# Patient Record
Sex: FEMALE | Race: WHITE | NOT HISPANIC OR LATINO | Employment: STUDENT | URBAN - METROPOLITAN AREA
[De-identification: names, ages, dates, MRNs, and addresses within clinical notes are randomized per-mention and may not be internally consistent; named-entity substitution may affect disease eponyms.]

---

## 2019-01-16 ENCOUNTER — HOSPITAL ENCOUNTER (OUTPATIENT)
Dept: FAMILY MEDICINE CLINIC | Facility: CLINIC | Age: 24
Discharge: HOME | End: 2019-01-16
Attending: NURSE PRACTITIONER

## 2019-01-16 ENCOUNTER — OFFICE VISIT CONVERTED (OUTPATIENT)
Dept: FAMILY MEDICINE CLINIC | Facility: CLINIC | Age: 24
End: 2019-01-16
Attending: NURSE PRACTITIONER

## 2019-01-16 ENCOUNTER — CONVERSION ENCOUNTER (OUTPATIENT)
Dept: FAMILY MEDICINE CLINIC | Facility: CLINIC | Age: 24
End: 2019-01-16

## 2019-01-16 LAB
25(OH)D3 SERPL-MCNC: 18.3 NG/ML (ref 30–100)
ALBUMIN SERPL-MCNC: 4.5 G/DL (ref 3.5–5)
ALBUMIN/GLOB SERPL: 1.4 {RATIO} (ref 1.4–2.6)
ALP SERPL-CCNC: 87 U/L (ref 42–98)
ALT SERPL-CCNC: 35 U/L (ref 10–40)
AMYLASE SERPL-CCNC: 59 U/L (ref 30–110)
ANION GAP SERPL CALC-SCNC: 18 MMOL/L (ref 8–19)
AST SERPL-CCNC: 25 U/L (ref 15–50)
BASOPHILS # BLD AUTO: 0.06 10*3/UL (ref 0–0.2)
BASOPHILS NFR BLD AUTO: 0.77 % (ref 0–3)
BILIRUB SERPL-MCNC: 0.56 MG/DL (ref 0.2–1.3)
BUN SERPL-MCNC: 12 MG/DL (ref 5–25)
BUN/CREAT SERPL: 15 {RATIO} (ref 6–20)
CALCIUM SERPL-MCNC: 9.4 MG/DL (ref 8.7–10.4)
CHLORIDE SERPL-SCNC: 101 MMOL/L (ref 99–111)
CHOLEST SERPL-MCNC: 192 MG/DL (ref 107–200)
CHOLEST/HDLC SERPL: 3.7 {RATIO} (ref 3–6)
CONV CO2: 24 MMOL/L (ref 22–32)
CONV TOTAL PROTEIN: 7.7 G/DL (ref 6.3–8.2)
CREAT UR-MCNC: 0.82 MG/DL (ref 0.5–0.9)
EOSINOPHIL # BLD AUTO: 0.13 10*3/UL (ref 0–0.7)
EOSINOPHIL # BLD AUTO: 1.82 % (ref 0–7)
ERYTHROCYTE [DISTWIDTH] IN BLOOD BY AUTOMATED COUNT: 11.6 % (ref 11.5–14.5)
EST. AVERAGE GLUCOSE BLD GHB EST-MCNC: 100 MG/DL
GFR SERPLBLD BASED ON 1.73 SQ M-ARVRAT: >60 ML/MIN/{1.73_M2}
GLOBULIN UR ELPH-MCNC: 3.2 G/DL (ref 2–3.5)
GLUCOSE SERPL-MCNC: 85 MG/DL (ref 65–99)
HBA1C MFR BLD: 14.8 G/DL (ref 12–16)
HBA1C MFR BLD: 5.1 % (ref 3.5–5.7)
HCT VFR BLD AUTO: 45 % (ref 37–47)
HDLC SERPL-MCNC: 52 MG/DL (ref 40–60)
LDLC SERPL CALC-MCNC: 114 MG/DL (ref 70–100)
LIPASE SERPL-CCNC: 23 U/L (ref 5–51)
LYMPHOCYTES # BLD AUTO: 2.13 10*3/UL (ref 1–5)
MCH RBC QN AUTO: 29.4 PG (ref 27–31)
MCHC RBC AUTO-ENTMCNC: 32.9 G/DL (ref 33–37)
MCV RBC AUTO: 89.6 FL (ref 81–99)
MONOCYTES # BLD AUTO: 0.41 10*3/UL (ref 0.2–1.2)
MONOCYTES NFR BLD AUTO: 5.71 % (ref 3–10)
NEUTROPHILS # BLD AUTO: 4.39 10*3/UL (ref 2–8)
NEUTROPHILS NFR BLD AUTO: 61.8 % (ref 30–85)
NRBC BLD AUTO-RTO: 0 % (ref 0–0.01)
OSMOLALITY SERPL CALC.SUM OF ELEC: 287 MOSM/KG (ref 273–304)
PLATELET # BLD AUTO: 330 10*3/UL (ref 130–400)
PMV BLD AUTO: 6.6 FL (ref 7.4–10.4)
POTASSIUM SERPL-SCNC: 4.2 MMOL/L (ref 3.5–5.3)
RBC # BLD AUTO: 5.02 10*6/UL (ref 4.2–5.4)
SODIUM SERPL-SCNC: 139 MMOL/L (ref 135–147)
T4 FREE SERPL-MCNC: 1.3 NG/DL (ref 0.9–1.8)
TRIGL SERPL-MCNC: 130 MG/DL (ref 40–150)
TSH SERPL-ACNC: 4.84 M[IU]/L (ref 0.27–4.2)
VARIANT LYMPHS NFR BLD MANUAL: 29.9 % (ref 20–45)
VLDLC SERPL-MCNC: 26 MG/DL (ref 5–37)
WBC # BLD AUTO: 7.11 10*3/UL (ref 4.8–10.8)

## 2019-01-17 LAB
A FUMIGATUS AB SER QL ID: <0.1 K[IU]/ML
AMER SYCAMORE IGE QN: <0.1 K[IU]/ML
BERMUDA GRASS IGE QN: <0.1 K[IU]/ML (ref 0–0.35)
BOXELDER IGE QN: <0.1 K[IU]/ML
CALIF WALNUT POLN IGE QN: <0.1 K[IU]/ML (ref 0–0.35)
CAT DANDER IGG QN: <0.1 K[IU]/ML (ref 0–0.35)
CLADOSPORIUM IGE: <0.1 K[IU]/ML
CMN PIGWEED IGE QN: <0.1 K[IU]/ML
COCOA IGE QN: <0.1 K[IU]/ML (ref 0–0.35)
COMMON RAGWEED IGE QN: <0.1 K[IU]/ML (ref 0–0.35)
CORN IGE QN: <0.1 K[IU]/ML (ref 0–0.35)
COTTONWOOD IGE QN: <0.1 K[IU]/ML
COW MILK IGE QN: <0.1 K[IU]/ML (ref 0–0.35)
D FARINAE IGE QN: <0.1 K[IU]/ML (ref 0–0.35)
D PTERONYSS IGE QN: <0.1 K[IU]/ML (ref 0–0.35)
DOG DANDER IGE QN: <0.1 K[IU]/ML (ref 0–0.35)
EGG WHITE IGE QN: <0.1 K[IU]/ML (ref 0–0.35)
GOOSEFOOT IGE QN: <0.1 K[IU]/ML (ref 0–0.35)
IGE SERPL-ACNC: 17 K[IU]/ML (ref 0–24)
IMMUNOCAP RESULT: NORMAL (ref 0–0)
JOHNSON GRASS IGE QN: <0.1 K[IU]/ML (ref 0–0.35)
MEADOW FESCUE IGE QN: <0.1 K[IU]/ML (ref 0–0.35)
MOLD IGE: <0.1 K[IU]/ML (ref 0–0.35)
MOUSE URINE PROT IGE QN: <0.1 K[IU]/ML
MT JUNIPER IGE QN: <0.1 K[IU]/ML
OAK DUST IGE QN: <0.1 K[IU]/ML (ref 0–0.35)
OAT IGE QN: <0.1 K[IU]/ML (ref 0–0.35)
P NOTATUM IGE QN: <0.1 K[IU]/ML
PEANUT IGE QN: <0.1 K[IU]/ML (ref 0–0.35)
PECAN/HICK TREE IGE QN: <0.1 K[IU]/ML (ref 0–0.35)
RICE IGE QN: <0.1 K[IU]/ML (ref 0–0.35)
ROACH IGE QN: <0.1 K[IU]/ML (ref 0–0.35)
SOYBEAN IGE QN: <0.1 K[IU]/ML (ref 0–0.35)
TIMOTHY IGE QN: <0.1 K[IU]/ML
WHEAT IGE QN: <0.1 K[IU]/ML (ref 0–0.35)
WHITE ASH IGE QN: <0.1 K[IU]/ML
WHITE BIRCH IGE QN: <0.1 K[IU]/ML (ref 0–0.35)
WHITE ELM IGE QN: <0.1 K[IU]/ML (ref 0–0.35)
WHITE MULBERRY IGE QN: <0.1 K[IU]/ML

## 2019-01-18 ENCOUNTER — HOSPITAL ENCOUNTER (OUTPATIENT)
Dept: ULTRASOUND IMAGING | Facility: HOSPITAL | Age: 24
Discharge: HOME | End: 2019-01-18
Attending: NURSE PRACTITIONER

## 2019-01-19 LAB — CONV CELIAC DISEASE AB-IGA: 200 MG/DL (ref 68–408)

## 2019-01-20 LAB — TTG IGA SER-ACNC: 0 U/ML (ref 0–3)

## 2019-02-06 ENCOUNTER — OFFICE VISIT CONVERTED (OUTPATIENT)
Dept: FAMILY MEDICINE CLINIC | Facility: CLINIC | Age: 24
End: 2019-02-06
Attending: NURSE PRACTITIONER

## 2019-02-06 ENCOUNTER — HOSPITAL ENCOUNTER (OUTPATIENT)
Dept: FAMILY MEDICINE CLINIC | Facility: CLINIC | Age: 24
Discharge: HOME | End: 2019-02-06
Attending: NURSE PRACTITIONER

## 2019-02-06 LAB
T4 FREE SERPL-MCNC: 1.2 NG/DL (ref 0.9–1.8)
TSH SERPL-ACNC: 3.79 M[IU]/L (ref 0.27–4.2)

## 2019-02-08 ENCOUNTER — HOSPITAL ENCOUNTER (OUTPATIENT)
Dept: GENERAL RADIOLOGY | Facility: HOSPITAL | Age: 24
Discharge: HOME | End: 2019-02-08
Attending: NURSE PRACTITIONER

## 2019-02-08 LAB
BACTERIA SPEC AEROBE CULT: NORMAL
CONV LAST MENSTURAL PERIOD: NORMAL
SPECIMEN SOURCE: NORMAL
SPECIMEN SOURCE: NORMAL
THIN PREP CVX: NORMAL

## 2019-09-18 ENCOUNTER — OFFICE VISIT CONVERTED (OUTPATIENT)
Dept: FAMILY MEDICINE CLINIC | Facility: CLINIC | Age: 24
End: 2019-09-18
Attending: NURSE PRACTITIONER

## 2020-06-09 ENCOUNTER — HOSPITAL ENCOUNTER (OUTPATIENT)
Dept: FAMILY MEDICINE CLINIC | Facility: CLINIC | Age: 25
Discharge: HOME | End: 2020-06-09
Attending: NURSE PRACTITIONER

## 2020-06-09 ENCOUNTER — OFFICE VISIT CONVERTED (OUTPATIENT)
Dept: FAMILY MEDICINE CLINIC | Facility: CLINIC | Age: 25
End: 2020-06-09
Attending: NURSE PRACTITIONER

## 2020-06-11 LAB
BACTERIA SPEC AEROBE CULT: ABNORMAL
BACTERIA SPEC AEROBE CULT: ABNORMAL

## 2020-06-15 LAB
CONV LAST MENSTURAL PERIOD: NORMAL
SPECIMEN SOURCE: NORMAL
SPECIMEN SOURCE: NORMAL
THIN PREP CVX: NORMAL

## 2020-06-18 ENCOUNTER — HOSPITAL ENCOUNTER (OUTPATIENT)
Dept: ULTRASOUND IMAGING | Facility: HOSPITAL | Age: 25
Discharge: HOME | End: 2020-06-18
Attending: NURSE PRACTITIONER

## 2020-07-23 ENCOUNTER — HOSPITAL ENCOUNTER (OUTPATIENT)
Dept: FAMILY MEDICINE CLINIC | Facility: CLINIC | Age: 25
Discharge: HOME | End: 2020-07-23
Attending: NURSE PRACTITIONER

## 2020-07-23 ENCOUNTER — OFFICE VISIT CONVERTED (OUTPATIENT)
Dept: FAMILY MEDICINE CLINIC | Facility: CLINIC | Age: 25
End: 2020-07-23
Attending: NURSE PRACTITIONER

## 2020-07-26 LAB — BACTERIA SPEC AEROBE CULT: NORMAL

## 2020-10-27 ENCOUNTER — CONVERSION ENCOUNTER (OUTPATIENT)
Dept: FAMILY MEDICINE CLINIC | Facility: CLINIC | Age: 25
End: 2020-10-27

## 2020-10-27 ENCOUNTER — OFFICE VISIT CONVERTED (OUTPATIENT)
Dept: FAMILY MEDICINE CLINIC | Facility: CLINIC | Age: 25
End: 2020-10-27
Attending: NURSE PRACTITIONER

## 2020-12-01 ENCOUNTER — OFFICE VISIT CONVERTED (OUTPATIENT)
Dept: FAMILY MEDICINE CLINIC | Facility: CLINIC | Age: 25
End: 2020-12-01
Attending: NURSE PRACTITIONER

## 2020-12-01 ENCOUNTER — HOSPITAL ENCOUNTER (OUTPATIENT)
Dept: FAMILY MEDICINE CLINIC | Facility: CLINIC | Age: 25
Discharge: HOME | End: 2020-12-01
Attending: NURSE PRACTITIONER

## 2020-12-01 LAB
ALBUMIN SERPL-MCNC: 4.2 G/DL (ref 3.5–5)
ALBUMIN/GLOB SERPL: 1.6 {RATIO} (ref 1.4–2.6)
ALP SERPL-CCNC: 84 U/L (ref 42–98)
ALT SERPL-CCNC: 33 U/L (ref 10–40)
ANION GAP SERPL CALC-SCNC: 16 MMOL/L (ref 8–19)
AST SERPL-CCNC: 23 U/L (ref 15–50)
BASOPHILS # BLD AUTO: 0.06 10*3/UL (ref 0–0.2)
BASOPHILS NFR BLD AUTO: 0.9 % (ref 0–3)
BILIRUB SERPL-MCNC: 0.46 MG/DL (ref 0.2–1.3)
BUN SERPL-MCNC: 11 MG/DL (ref 5–25)
BUN/CREAT SERPL: 14 {RATIO} (ref 6–20)
CALCIUM SERPL-MCNC: 9.5 MG/DL (ref 8.7–10.4)
CHLORIDE SERPL-SCNC: 102 MMOL/L (ref 99–111)
CHOLEST SERPL-MCNC: 217 MG/DL (ref 107–200)
CHOLEST/HDLC SERPL: 3.7 {RATIO} (ref 3–6)
CONV ABS IMM GRAN: 0.03 10*3/UL (ref 0–0.2)
CONV CO2: 23 MMOL/L (ref 22–32)
CONV IMMATURE GRAN: 0.4 % (ref 0–1.8)
CONV TOTAL PROTEIN: 6.8 G/DL (ref 6.3–8.2)
CREAT UR-MCNC: 0.81 MG/DL (ref 0.5–0.9)
DEPRECATED RDW RBC AUTO: 40.5 FL (ref 36.4–46.3)
EOSINOPHIL # BLD AUTO: 0.2 10*3/UL (ref 0–0.7)
EOSINOPHIL # BLD AUTO: 2.9 % (ref 0–7)
ERYTHROCYTE [DISTWIDTH] IN BLOOD BY AUTOMATED COUNT: 12.4 % (ref 11.7–14.4)
GFR SERPLBLD BASED ON 1.73 SQ M-ARVRAT: >60 ML/MIN/{1.73_M2}
GLOBULIN UR ELPH-MCNC: 2.6 G/DL (ref 2–3.5)
GLUCOSE SERPL-MCNC: 96 MG/DL (ref 65–99)
HCT VFR BLD AUTO: 42.2 % (ref 37–47)
HDLC SERPL-MCNC: 59 MG/DL (ref 40–60)
HGB BLD-MCNC: 13.6 G/DL (ref 12–16)
LDLC SERPL CALC-MCNC: 126 MG/DL (ref 70–100)
LYMPHOCYTES # BLD AUTO: 2.08 10*3/UL (ref 1–5)
LYMPHOCYTES NFR BLD AUTO: 30.3 % (ref 20–45)
MCH RBC QN AUTO: 28.8 PG (ref 27–31)
MCHC RBC AUTO-ENTMCNC: 32.2 G/DL (ref 33–37)
MCV RBC AUTO: 89.4 FL (ref 81–99)
MONOCYTES # BLD AUTO: 0.47 10*3/UL (ref 0.2–1.2)
MONOCYTES NFR BLD AUTO: 6.8 % (ref 3–10)
NEUTROPHILS # BLD AUTO: 4.03 10*3/UL (ref 2–8)
NEUTROPHILS NFR BLD AUTO: 58.7 % (ref 30–85)
NRBC CBCN: 0 % (ref 0–0.7)
OSMOLALITY SERPL CALC.SUM OF ELEC: 283 MOSM/KG (ref 273–304)
PLATELET # BLD AUTO: 319 10*3/UL (ref 130–400)
PMV BLD AUTO: 9.1 FL (ref 9.4–12.3)
POTASSIUM SERPL-SCNC: 4.4 MMOL/L (ref 3.5–5.3)
RBC # BLD AUTO: 4.72 10*6/UL (ref 4.2–5.4)
SODIUM SERPL-SCNC: 137 MMOL/L (ref 135–147)
TRIGL SERPL-MCNC: 159 MG/DL (ref 40–150)
TSH SERPL-ACNC: 4.95 M[IU]/L (ref 0.27–4.2)
VLDLC SERPL-MCNC: 32 MG/DL (ref 5–37)
WBC # BLD AUTO: 6.87 10*3/UL (ref 4.8–10.8)

## 2021-02-02 ENCOUNTER — OFFICE VISIT CONVERTED (OUTPATIENT)
Dept: FAMILY MEDICINE CLINIC | Facility: CLINIC | Age: 26
End: 2021-02-02
Attending: NURSE PRACTITIONER

## 2021-04-01 ENCOUNTER — OFFICE VISIT CONVERTED (OUTPATIENT)
Dept: FAMILY MEDICINE CLINIC | Facility: CLINIC | Age: 26
End: 2021-04-01
Attending: NURSE PRACTITIONER

## 2021-04-01 ENCOUNTER — HOSPITAL ENCOUNTER (OUTPATIENT)
Dept: LAB | Facility: HOSPITAL | Age: 26
Discharge: HOME | End: 2021-04-01
Attending: NURSE PRACTITIONER

## 2021-04-01 LAB
T4 FREE SERPL-MCNC: 1.3 NG/DL (ref 0.9–1.8)
TSH SERPL-ACNC: 1.31 M[IU]/L (ref 0.27–4.2)

## 2021-05-13 NOTE — PROGRESS NOTES
"   Progress Note      Patient Name: Stephania Galvan   Patient ID: [id number]   Sex: Female   YOB: 1995    Primary Care Provider: Sai ZHANG   Referring Provider: Sai ZHANG    Visit Date: October 27, 2020    Provider: LEATHA Caraballo   Location: [facility]   Location Address: [address] [place] [address]  [id number]   Location Phone: [phone]          Chief Complaint  · acute visit for worsening menstrual cramps       History Of Present Illness  Stephania Galvan is a 25 year old /White female who presents for evaluation and treatment of:      acute visit for worsening menstrual cramps     pt states that the pain has been going on since the beginning of the month. Pt states that she has these cramps even when she is not on period.     pt states that she usually has bad cramps but they are worse now.  no current birth control  would like to try bcp and refer to GYN to discuss IUD    Still has migraines almost every day since august when she started college    pt reports same presentation   occipital area  +photophobia  +phonophobia  migraines since adolescents     pt states that she uses Menstrual cups and states that the smell is a strong \"blanche\" odor         WIF-09-38-20   last pap-06-       Past Medical History  Disease Name Date Onset Notes   Migraines --  --          Medication List  Name Date Started Instructions   Imitrex 100 mg oral tablet 06/09/2020 take 1 tablet (100 mg) by oral route after onset of migraine; may repeat after 2 hours if headache returns   Singulair 10 mg Oral tablet 06/09/2020 take 1 tablet (10 mg) by oral route once daily in the evening for 90 days   Zyrtec 10 mg oral tablet 06/09/2020 take 1 tablet by oral route once a day (at bedtime) for 90 days         Allergy List  Allergen Name Date Reaction Notes   NO KNOWN DRUG ALLERGIES --  --  --          Family Medical " History  Disease Name Relative/Age Notes   Stroke  --    Heart Disease  --    Diabetes Mellitus, Type II  --    Family history of breast cancer  --          Social History  Finding Status Start/Stop Quantity Notes   Alcohol Current some day --/-- --  --    Tobacco Never --/-- --  --          Immunizations  NameDate Admin Mfg Trade Name Lot Number Route Inj VIS Given VIS Publication   Shgguuprq24/15/2019 [facility] Fluzone Quadrivalent [id number] IM RD 10/15/2019    Comments: ndc [id number]         Review of Systems  · Constitutional  o Denies  o : fever, chills  · Eyes  o Denies  o : changes in vision  · HENT  o Admits  o : headaches  o Denies  o : ear pain, sore throat  · Cardiovascular  o Denies  o : chest Pain  · Respiratory  o Denies  o : frequent cough, shortness of breath  · Gastrointestinal  o Denies  o : nausea, vomiting  · Genitourinary  o Admits  o : dysmenorrhea, menorrhagia  o Denies  o : dysuria  · Neurologic  o Admits  o : headache  o Denies  o : dizziness  · Allergic-Immunologic  o Admits  o : seasonal allergies  o Denies  o : eczema, urticaria      Vitals  Date Time BP Position Site L\R Cuff Size HR RR TEMP (F) WT  HT  BMI kg/m2 BSA m2 O2 Sat FR L/min FiO2 HC       06/09/2020 07:08 /73 Sitting    78 - R  97.6 276lbs 4oz 5'   53.95 2.3 97 %  21%    07/23/2020 03:56 /71 Sitting    84 - R  97.7 273lbs 4oz 5'   53.36 2.29 100 %  21%    10/27/2020 09:04 /83 Sitting    94 - R 24 97.6 152lbs 2oz 5'   29.71 1.71 98 %  21%          Physical Examination  · Constitutional  o Appearance  o : well-nourished, in no acute distress  · Eyes  o Conjunctivae  o : conjunctivae injected bilaterally  o Sclerae  o : sclerae white  o Pupils and Irises  o : pupils equal and round  o Eyelids/Ocular Adnexae  o : eyelid appearance normal, no exudates present  · Ears, Nose, Mouth and Throat  o Ears  o :   § External Ears  § : external auditory canal appearance within normal limits, no discharge present  § Otoscopic  Examination  § : tympanic membrane appearance injected bilaterally  o Nose  o :   § External Nose  § : appearance normal  § Intranasal Exam  § : mucosa inflamed, sinuses non tender to palpation  § Nasopharynx  § : no discharge present  o Oral Cavity  o :   § Oral Mucosa  § : oral mucosa normal  o Throat  o :   § Oropharynx  § : no inflammation or lesions present, tonsils within normal limits  · Neck  o Inspection/Palpation  o : normal appearance, trachea midline  o Thyroid  o : gland size normal, nontender  · Respiratory  o Respiratory Effort  o : breathing unlabored  o Inspection of Chest  o : normal appearance  o Auscultation of Lungs  o : normal breath sounds throughout inspiration and expiration  · Cardiovascular  o Heart  o :   § Auscultation of Heart  § : regular rate and rhythm, no murmurs  · Gastrointestinal  o Abdominal Examination  o : abdomen nontender to palpation, bowel sounds present   · Lymphatic  o Neck  o : no lymphadenopathy present  · Skin and Subcutaneous Tissue  o General Inspection  o : pink, warm, dry   · Neurologic  o Mental Status Examination  o :   § Orientation  § : grossly oriented to person, place and time  o Gait and Station  o : normal gait, able to stand without difficulty  · Psychiatric  o Judgement and Insight  o : judgment and insight intact  o Mood and Affect  o : mood normal, affect appropriate          Assessment  · Allergic rhinitis due to allergen     477.9/J30.9  currently controlled   · Migraine     346.90/G43.909  · Heavy menses     626.2/N92.0  · Painful menstruation     625.3/N94.6      Plan  · Orders  o ACO-39: Current medications updated and reviewed (1159F, ) - - 10/27/2020  o ACO-14: Influenza immunization administered or previously received [facility] () - - 10/27/2020  o OB/GYN CONSULTATION (OBGYN) - 626.2/N92.0, 625.3/N94.6 - 10/27/2020  · Medications  o Topamax 25 mg oral tablet   SIG: take 1 tab po qpm x1week then 1 tab po bid x1week then 1 tab po qam and 2  tabs qpm x1 week then 2tabs po bid   DISP: (60) Tablet with 0 refills  Prescribed on 10/27/2020     o Lo Loestrin Fe 1 mg-10 mcg (24)/10 mcg (2) oral tablet   SIG: take 1 tablet by oral route once daily for 30 days   DISP: (30) Tablet with 2 refills  Prescribed on 10/27/2020     o Imitrex 100 mg oral tablet   SIG: take 1 tablet (100 mg) by oral route after onset of migraine; may repeat after 2 hours if headache returns   DISP: (9) Tablet with 3 refills  Refilled on 10/27/2020     o Singulair 10 mg Oral tablet   SIG: take 1 tablet (10 mg) by oral route once daily in the evening for 90 days   DISP: (90) Tablet with 1 refills  Refilled on 10/27/2020     o Zyrtec 10 mg oral tablet   SIG: take 1 tablet by oral route once a day (at bedtime) for 90 days   DISP: (90) Tablet with 1 refills  Refilled on 10/27/2020     o Medications have been Reconciled  o Transition of Care or Provider Policy  · Instructions  o Patient was educated/instructed on their diagnosis, treatment and medications prior to discharge from the clinic today.  · Disposition  o Follow Up in Office in 1 month or sooner if needed            Electronically Signed by: LEATHA Caraballo -Author on October 27, 2020 09:27:02 AM

## 2021-05-13 NOTE — PROGRESS NOTES
Progress Note      Patient Name: Stephania Galvan   Patient ID: [id number]   Sex: Female   YOB: 1995    Primary Care Provider: Sai ZHANG   Referring Provider: Sai ZHANG    Visit Date: December 1, 2020    Provider: LEATHA Caraballo   Location: [facility]   Location Address: [address] [place], [place] [id number]   Location Phone: [phone]          Chief Complaint  · 1 month follow up for migraine headaches       History Of Present Illness  Stephania Galvan is a 25 year old /White female who presents for evaluation and treatment of:      1 month follow up for migraine headaches     pt reports migraines decreased to 1-2 per week    pt c.o intermittent right ear pain, states she is unable to tolerate nasal sprays  feels she is allergic to cats would like a referral to allergist   taking Zyrtec and singulair daily as directed     GYN- Alicja Myers.   Pt is currently taking Balcoltra birth control pills, just started taking them 11/29.  Alicja switched her from Loestril 1/20 to Balcoltra for adjustment    states that when she has a headache it is hard tot have her eyes focus on the TV.   Has seen eye dr this year, next appt is January 2021           Past Medical History  Disease Name Date Onset Notes   Migraines --  --          Medication List  Name Date Started Instructions   Balcoltra 0.1 mg-0.02 mg (21)/36.5 mg(7) oral tablet  take 1 tablet by oral route once daily   Imitrex 100 mg oral tablet 10/27/2020 take 1 tablet (100 mg) by oral route after onset of migraine; may repeat after 2 hours if headache returns   Singulair 10 mg Oral tablet 10/27/2020 take 1 tablet (10 mg) by oral route once daily in the evening for 90 days   Topamax 25 mg oral tablet 10/27/2020 take 1 tab po qpm x1week then 1 tab po bid x1week then 1 tab po qam and 2 tabs qpm x1 week then 2tabs po bid   Zyrtec 10 mg oral tablet 10/27/2020  take 1 tablet by oral route once a day (at bedtime) for 90 days         Allergy List  Allergen Name Date Reaction Notes   NO KNOWN DRUG ALLERGIES --  --  --          Family Medical History  Disease Name Relative/Age Notes   Stroke  --    Heart Disease  --    Diabetes Mellitus, Type II  --    Family history of breast cancer  --          Social History  Finding Status Start/Stop Quantity Notes   Alcohol Current some day --/-- --  --    Tobacco Never --/-- --  --          Immunizations  NameDate Admin Mfg Trade Name Lot Number Route Inj VIS Given VIS Publication   Xemagxcad88/15/2019 [facility] Fluzone Quadrivalent [id number] IM RD 10/15/2019    Comments: ndc [id number]         Review of Systems  · Constitutional  o Denies  o : fever, chills  · Eyes  o Denies  o : changes in vision  · HENT  o Admits  o : headaches  o Denies  o : ear pain, postnasal drainage, sore throat  · Cardiovascular  o Denies  o : chest Pain  · Respiratory  o Denies  o : frequent cough  · Gastrointestinal  o Denies  o : nausea, vomiting, changes in bowel habits  · Genitourinary  o Denies  o : dysuria  · Neurologic  o Admits  o : headache  o Denies  o : dizziness  · Allergic-Immunologic  o Admits  o : seasonal allergies      Vitals  Date Time BP Position Site L\R Cuff Size HR RR TEMP (F) WT  HT  BMI kg/m2 BSA m2 O2 Sat FR L/min FiO2 HC       07/23/2020 03:56 /71 Sitting    84 - R  97.7 273lbs 4oz 5'   53.36 2.29 100 %  21%    10/27/2020 09:04 /83 Sitting    94 - R 24 97.6 152lbs 2oz 5'   29.71 1.71 98 %  21%    12/01/2020 10:58 /72 Sitting    96 - R 22 98.4 275lbs 4oz 5'   53.76 2.3 98 %            Physical Examination  · Constitutional  o Appearance  o : well-nourished, in no acute distress  · Eyes  o Conjunctivae  o : conjunctivae injected bilaterally  o Sclerae  o : sclerae white  o Pupils and Irises  o : pupils equal and round  o Eyelids/Ocular Adnexae  o : eyelid appearance normal  · Ears, Nose, Mouth and Throat  o Ears  o :    § External Ears  § : external auditory canal appearance within normal limits, no discharge present  § Otoscopic Examination  § : tympanic membrane appearance within normal limits left, right injected   o Nose  o :   § External Nose  § : appearance normal  § Intranasal Exam  § : mucosa inflamed, sinuses non tender to palpation  § Nasopharynx  § : no discharge present  o Oral Cavity  o :   § Oral Mucosa  § : oral mucosa normal  o Throat  o :   § Oropharynx  § : no inflammation or lesions present, tonsils within normal limits, PND  · Neck  o Inspection/Palpation  o : normal appearance, no masses or tenderness, trachea midline  · Respiratory  o Respiratory Effort  o : breathing unlabored  o Auscultation of Lungs  o : normal breath sounds throughout inspiration and expiration  · Cardiovascular  o Heart  o :   § Auscultation of Heart  § : regular rate and rhythm, no murmurs  · Lymphatic  o Neck  o : no lymphadenopathy present  · Skin and Subcutaneous Tissue  o General Inspection  o : pink, warm, dry   · Neurologic  o Mental Status Examination  o :   § Orientation  § : grossly oriented to person, place and time  o Gait and Station  o : normal gait, able to stand without difficulty          Assessment  · Allergic rhinitis due to allergen     477.9/J30.9  will refer to allergist   · Migraine headache     346.90/G43.909  will continue Topamax       Plan  · Orders  o ALLERGY CONSULTATION (ALLEG) - 477.9/J30.9 - 12/01/2020  o Physical, Primary Care Panel (CBC, CMP, Lipid, TSH) [facility] (15715, 00848, 41659, 44821) - - 12/01/2020  o ACO-39: Current medications updated and reviewed (, 1159F) - - 12/01/2020  · Medications  o Topamax 50 mg oral tablet   SIG: one tablet po BID   DISP: (90) Tablet with 1 refills  Adjusted on 12/01/2020     o Medications have been Reconciled  o Transition of Care or Provider Policy  · Instructions  o Patient was educated/instructed on their diagnosis, treatment and medications prior to discharge  from the clinic today.  · Disposition  o follow up in 4 months or sooner if needed            Electronically Signed by: LEATHA Caraballo -Author on December 1, 2020 11:26:58 AM

## 2021-05-13 NOTE — PROGRESS NOTES
Progress Note      Patient Name: Stephania Galvan   Patient ID: [id number]   Sex: Female   YOB: 1995        Visit Date: June 9, 2020    Provider: LEATHA Caraballo   Location: [facility]   Location Address: [address] [place] [address]  [id number]   Location Phone: [phone]          Chief Complaint  · Annual Exam  · PAP exam  · sore throat and ear pain   · loss of menstrual cycle       History Of Present Illness  Stephania Galvan is a 24 year old /White female who presents for evaluation and treatment of:      Annual Exam    C/O no menstrual cycle since February  Cramping constantly   Denies sexual activity with men    C/O sore throat Saturday evening  taking Zyrtec daily   also c/o pnd and ear pain   Last PAP Smear: 2/6/2019.   Date of Last Mammogram: none.   Date of Last Colonoscopy: none       Past Medical History  Disease Name Date Onset Notes   Migraines --  --          Medication List  Name Date Started Instructions   Imitrex 100 mg oral tablet 01/16/2019 take 1 tablet (100 mg) by oral route after onset of migraine; may repeat after 2 hours if headache returns   loperamide 2 mg oral tablet 01/16/2019 take 2 tablets (4 mg) by oral route after 1st loose stool and 1 tablet (2 mg) after each next bowel movement; do not exceed 16 mg in 24hrs   Zyrtec 10 mg oral tablet 09/18/2019 take 1 tablet by oral route once a day (at bedtime) for 90 days         Allergy List  Allergen Name Date Reaction Notes   NO KNOWN DRUG ALLERGIES --  --  --          Family Medical History  Disease Name Relative/Age Notes   Stroke  --    Heart Disease  --    Diabetes mellitus, type II  --    Family history of breast cancer  --          Social History  Finding Status Start/Stop Quantity Notes   Alcohol Current some day --/-- --  --    Tobacco Never --/-- --  --          Immunizations  NameDate Admin Mfg Trade Name Lot Number Route Inj VIS Given VIS Publication    Xhsuksglw72/15/2019 [facility] Fluzone Quadrivalent [id number] IM RD 10/15/2019    Comments: ndc [id number]         Review of Systems  · Constitutional  o Admits  o : fatigue  o Denies  o : fever  · Eyes  o Denies  o : discharge from eye  · HENT  o Admits  o : postnasal drip, sore throat, ear pain  o Denies  o : headaches  · Breasts  o Denies  o : lumps, tenderness, swelling  · Cardiovascular  o Denies  o : chest pain  · Respiratory  o Denies  o : cough  · Gastrointestinal  o Denies  o : nausea, vomiting, diarrhea, constipation, abdominal pain  · Genitourinary  o Admits  o : vaginal discharge  o Denies  o : dysuria  · Integument  o Denies  o : rash  · Endocrine  o Admits  o : central obesity  o Denies  o : polyuria, polydipsia  · Allergic-Immunologic  o Admits  o : sinus allergy symptoms      Vitals  Date Time BP Position Site L\R Cuff Size HR RR TEMP (F) WT  HT  BMI kg/m2 BSA m2 O2 Sat        02/06/2019 09:24 /94 Sitting    100 - R 18 98 263lbs 0oz 5'   51.36 2.25 98 %    09/18/2019 08:49 /52 Sitting    64 - R   273lbs 8oz 5'   53.41 2.29 98 %    06/09/2020 07:08 /73 Sitting    78 - R  97.6 276lbs 4oz 5'   53.95 2.3 97 %          Physical Examination  · Constitutional  o Appearance  o : well-nourished, in no acute distress  · Eyes  o Conjunctivae  o : conjunctivae normal  o Sclerae  o : sclerae white  o Pupils and Irises  o : pupils equal and round  o Eyelids/Ocular Adnexae  o : eyelid appearance normal, no exudates present  · Ears, Nose, Mouth and Throat  o Ears  o :   § External Ears  § : external auditory canal appearance within normal limits, no discharge present  § Otoscopic Examination  § : tympanic membrane appearance within normal limits left, right erythematous with fluid  o Nose  o :   § External Nose  § : appearance normal  § Intranasal Exam  § : mucosa inflamed, vestibules normal, no intranasal lesions present, septum midline, sinuses non tender to palpation  § Nasopharynx  § : no discharge  present  o Oral Cavity  o :   § Oral Mucosa  § : oral mucosa normal  § Lips  § : lip appearance normal  § Teeth  § : normal dentation for age  o Throat  o :   § Oropharynx  § : erythematous with exudate bilaterally  · Neck  o Inspection/Palpation  o : normal appearance, no masses or tenderness, trachea midline  o Range of Motion  o : cervical range of motion within normal limits  o Thyroid  o : gland size normal, nontender, no nodules or masses present on palpation  · Respiratory  o Respiratory Effort  o : breathing unlabored  o Inspection of Chest  o : normal appearance  o Auscultation of Lungs  o : normal breath sounds throughout inspiration and expiration  · Cardiovascular  o Heart  o :   § Auscultation of Heart  § : regular rate and rhythm, no murmurs, gallops or rubs  o Peripheral Vascular System  o :   § Carotid Arteries  § : normal pulses bilaterally, no bruits present  § Pedal Pulses  § : pulses 2 bilaterally  § Extremities  § : no clubbing or edema  · Breasts  o Inspection of Breasts  o : breasts symmetrical, no skin changes, no deformities present, no discharge present  o Palpation of Breasts, Axillae  o : no masses present on palpation, no breast tenderness  · Gastrointestinal  o Abdominal Examination  o : abdomen nontender to palpation, tone normal without rigidity or guarding, no masses present, bowel sounds present   · Genitourinary  o External Genitalia  o : no inflammation, no lesions present  o Vagina  o : normal vaginal vault, thin white discharge present, no inflammatory lesions present, no masses present  o Bladder  o : nontender to palpation  o Cervix  o : appearance healthy, no lesions present, nontender to palpation, no discharges, no bleeding present, normal midline position  o Uterus  o : nontender to palpation, no masses present, position midline/midplane  o Adnexa  o : no tenderness or masses present on bimanual examination  o Anus  o : no inflammation or lesions present  o Perineum  o :  perineum within normal limits  · Lymphatic  o Neck  o : no lymphadenopathy present  o Axilla  o : no lymphadenopathy present  o Groin  o : no lymphadenopathy present  · Skin and Subcutaneous Tissue  o General Inspection  o : no rashes or lesions present, no areas of discoloration  o Body Hair  o : hair normal for age, general body hair distribution normal for age  o Digits and Nails  o : no clubbing, cyanosis, deformities or edema present, normal appearing nails  · Neurologic  o Mental Status Examination  o :   § Orientation  § : grossly oriented to person, place and time  o Gait and Station  o : normal gait, able to stand without difficulty  · Psychiatric  o Judgement and Insight  o : judgment and insight intact  o Mood and Affect  o : mood normal, affect appropriate          Results  · In-Office Procedures  o Lab procedure  § IOP - Rapid Strep (24339)   § Beta Strep Gp A Culture: Negative   § Internal Control Verified?: Yes       Assessment  · Screening for depression     V79.0/Z13.89  · Allergic rhinitis due to allergen     477.9/J30.9  · Depression     311/F32.9  pt denies, states her feelings are due to the covid restrictions at this time. pt declines treatment. pt denies si.hi  · Hyperlipidemia     272.4/E78.5  · Pharyngitis, acute     462/J02.9  · Migraines     346.90/G43.909  · Amenorrhea     626.0/N91.2  · Routine gynecological examination     V72.31/Z01.419  · Pap smear, as part of routine gynecological examination     V76.2/Z01.419  · Vaginal Discharge     623.5/N89.8  · Family history of breast cancer in mother     V16.3/Z80.3  · Acute suppurative otitis media of right ear without spontaneous rupture of tympanic membrane, recurrence not specified     382.00/H66.001      Plan  · Orders  o ACO-18: Positive screen for clinical depression using a standardized tool and a follow-up plan documented () - V79.0/Z13.89 - 06/09/2020  o Vaginal Screen (Candida, Gardnerella & Trichomonas) (54902) -  V72.31/Z01.419 - 06/09/2020  o Pap smear (80831) - V76.2/Z01.419 - 06/09/2020  o Genital culture (24941) - 623.5/N89.8 - 06/09/2020  o Hormone Panel (Estrogen, FSH, LH, Progesterone) [facility] (43187, 98185, 90648, 22962) - 626.0/N91.2 - 06/09/2020  o ACO-39: Current medications updated and reviewed () - - 06/09/2020  o ACO-18: Positive screen for clinical depression using a standardized tool and a follow-up plan documented () - - 06/09/2020  o Pelvic ultrasound (72626, 09390) - 626.0/N91.2 - 06/09/2020  o BRCA1 gene sequencing (08781, 92039) - V16.3/Z80.3 - 06/09/2020  o Testosterone female/child (37726) - 626.0/N91.2 - 06/09/2020  o Estab. Pt. 15 Min Low/Moderate (24629) - 272.4/E78.5, 346.90/G43.909, 477.9/J30.9 - 06/09/2020  · Medications  o cefdinir 300 mg oral capsule   SIG: take 1 capsule (300 mg) by oral route every 12 hours for 10 days   DISP: (20) capsules with 0 refills  Prescribed on 06/09/2020     o Singulair 10 mg Oral tablet   SIG: take 1 tablet (10 mg) by oral route once daily in the evening for 90 days   DISP: (90) tablets with 1 refills  Prescribed on 06/09/2020     o Imitrex 100 mg oral tablet   SIG: take 1 tablet (100 mg) by oral route after onset of migraine; may repeat after 2 hours if headache returns   DISP: (9) tablets with 3 refills  Refilled on 06/09/2020     o loperamide 2 mg oral tablet   SIG: take 2 tablets (4 mg) by oral route after 1st loose stool and 1 tablet (2 mg) after each next bowel movement; do not exceed 16 mg in 24hrs   DISP: (60) tablets with 1 refills  Refilled on 06/09/2020     o Zyrtec 10 mg oral tablet   SIG: take 1 tablet by oral route once a day (at bedtime) for 90 days   DISP: (90) tablets with 1 refills  Refilled on 06/09/2020     · Instructions  o Depression Screen completed and scanned into the EMR under the designated folder within the patient's documents.  o Today's PHQ-9 result is _6__  o Advised that cheeses and other sources of dairy fats, animal fats,  fast food, and the extras (candy, pastries, pies, doughnuts and cookies) all contain LDL raising nutrients. Advised to increase fruits, vegetables, whole grains, and to monitor portion sizes.   o **Pap Test/Liquid Based:   o Thin Prep  o Source:  o Cervix  o ********  o **Perform Reflex Human Papilloma Virus (HPV) High Risk on this Pap (If atypical squamous cells of the undetermined signifigcance (ASCUS)/Atypical Glandular Cells of undetermined significance (AGCUS): Low Grade Squamous Intraepitheal lesion (LGSIL): **  o Yes, upon instruction from sending office  o ********  o Medicare:  o No  o **Is this an annual PAP:  o Yes  o No  o Patient was educated/instructed on their diagnosis, treatment and medications prior to discharge from the clinic today.  o Counseled on monthly breast self exams.   o Counseled on STD prevention.  o Counseled on diet and exercise.   o Counseled on weight-bearing exercise.  o Recommended Calcium with Vitamin D twice daily.  · Disposition  o Call or Return if symptoms worsen or persist.  o will contact with diagnostics results  o FOLLOW UP PENDING RESULTS            Electronically Signed by: LEATHA Caraballo -Author on June 9, 2020 08:10:28 AM

## 2021-05-13 NOTE — PROGRESS NOTES
Progress Note      Patient Name: Stephania Galvan   Patient ID: [id number]   Sex: Female   YOB: 1995    Primary Care Provider: Sai ZHANG   Referring Provider: Sai ZHANG    Visit Date: July 23, 2020    Provider: LEATHA Caraballo   Location: [facility]   Location Address: [address] [place], [place] [id number]   Location Phone: [phone]          Chief Complaint  · acute visit for vaginal cyst      History Of Present Illness  Stephania Galvan is a 24 year old /White female who presents for evaluation and treatment of:      Pt complaining of vaginal cyst, worsening since Sunday.  states she is usually able to sit on cyst and pop on her own          Past Medical History  Disease Name Date Onset Notes   Migraines --  --          Medication List  Name Date Started Instructions   Imitrex 100 mg oral tablet 06/09/2020 take 1 tablet (100 mg) by oral route after onset of migraine; may repeat after 2 hours if headache returns   loperamide 2 mg oral tablet 06/09/2020 take 2 tablets (4 mg) by oral route after 1st loose stool and 1 tablet (2 mg) after each next bowel movement; do not exceed 16 mg in 24hrs   Singulair 10 mg Oral tablet 06/09/2020 take 1 tablet (10 mg) by oral route once daily in the evening for 90 days   Zyrtec 10 mg oral tablet 06/09/2020 take 1 tablet by oral route once a day (at bedtime) for 90 days         Allergy List  Allergen Name Date Reaction Notes   NO KNOWN DRUG ALLERGIES --  --  --          Family Medical History  Disease Name Relative/Age Notes   Stroke  --    Heart Disease  --    Diabetes Mellitus, Type II  --    Family history of breast cancer  --          Social History  Finding Status Start/Stop Quantity Notes   Alcohol Current some day --/-- --  --    Tobacco Never --/-- --  --          Immunizations  NameDate Admin Mfg Trade Name Lot Number Route Inj VIS Given VIS Publication   [id number]/15/2019  [facility] Fluzone Quadrivalent [id number] IM RD 10/15/2019    Comments: ndc [id number]         Review of Systems  · Constitutional  o Denies  o : fever, chills  · Cardiovascular  o Denies  o : chest pain  · Respiratory  o Denies  o : cough  · Gastrointestinal  o Denies  o : nausea, vomiting, diarrhea, constipation, abdominal pain  · Genitourinary  o Denies  o : dysuria, vaginal discharge  · Integument  o Admits  o : new skin lesions      Vitals  Date Time BP Position Site L\R Cuff Size HR RR TEMP (F) WT  HT  BMI kg/m2 BSA m2 O2 Sat HC       09/18/2019 08:49 /52 Sitting    64 - R   273lbs 8oz 5'   53.41 2.29 98 %    06/09/2020 07:08 /73 Sitting    78 - R  97.6 276lbs 4oz 5'   53.95 2.3 97 %    07/23/2020 03:56 /71 Sitting    84 - R  97.7 273lbs 4oz 5'   53.36 2.29 100 %          Physical Examination  · Constitutional  o Appearance  o : well-nourished, in no acute distress  · Respiratory  o Respiratory Effort  o : breathing unlabored  o Inspection of Chest  o : normal appearance  o Auscultation of Lungs  o : normal breath sounds throughout inspiration and expiration  · Cardiovascular  o Heart  o :   § Auscultation of Heart  § : regular rate and rhythm, no murmurs  · Gastrointestinal  o Abdominal Examination  o : abdomen nontender to palpation, tone normal without rigidity or guarding, no masses present, bowel sounds present   · Genitourinary  o External Genitalia  o : fluctuant indurated and erythematous area on right outer labia   o Urethra  o :   § Urethral Meatus  § : no inflammation present  § Urethral Body  § : urethra palpation normal  o Bladder  o : nontender to palpation  o Adnexa  o : no tenderness or masses present on bimanual examination  o Anus  o : no inflammation or lesions present  o Perineum  o : perineum within normal limits  · Skin and Subcutaneous Tissue  o General Inspection  o : pink, warm, dry   · Neurologic  o Gait and Station  o : normal gait, able to stand without  difficulty          Assessment  · Abscess     682.9/L02.91      Plan  · Orders  o ACO-39: Current medications updated and reviewed () - - 07/23/2020  o ACO-14: Influenza immunization administered or previously received () - - 07/23/2020  o Wound Culture with Sensitivities if indicated [facility] ([id number]) - 682.9/L02.91 - 07/23/2020  o I & D abscess, simple or single [facility] ([id number]) - 682.9/L02.91 - 07/29/2020  · Medications  o Bactrim -160 mg oral tablet   SIG: take 1 tablet by oral route every 12 hours for 10 days   DISP: (20) tablets with 0 refills  Prescribed on 07/23/2020     o Medications have been Reconciled  o Transition of Care or Provider Policy  · Instructions  o Patient was educated/instructed on their diagnosis, treatment and medications prior to discharge from the clinic today.  · Disposition  o Call or Return if symptoms worsen or persist.  o Follow up PRN  o will contact with diagnostics results     consent obtained  allergies verified  area prepped with betadine  area anesthetized with 3 cc 1% lidocaine   11 blade was used to make a horizontal incision across body of abscess   drainage removed  culture obtained  area cleansed and dressed  pt tolerated well  after care instructions provided             Electronically Signed by: LEATHA Caraballo -Author on July 29, 2020 07:53:47 AM

## 2021-05-14 VITALS
TEMPERATURE: 98.4 F | BODY MASS INDEX: 54.04 KG/M2 | WEIGHT: 275.25 LBS | DIASTOLIC BLOOD PRESSURE: 72 MMHG | RESPIRATION RATE: 22 BRPM | SYSTOLIC BLOOD PRESSURE: 125 MMHG | OXYGEN SATURATION: 98 % | HEIGHT: 60 IN | HEART RATE: 96 BPM

## 2021-05-14 VITALS
WEIGHT: 282.12 LBS | OXYGEN SATURATION: 98 % | SYSTOLIC BLOOD PRESSURE: 120 MMHG | RESPIRATION RATE: 20 BRPM | HEART RATE: 72 BPM | HEIGHT: 60 IN | DIASTOLIC BLOOD PRESSURE: 63 MMHG | BODY MASS INDEX: 55.39 KG/M2 | TEMPERATURE: 97.6 F

## 2021-05-14 VITALS
SYSTOLIC BLOOD PRESSURE: 143 MMHG | HEIGHT: 60 IN | TEMPERATURE: 98.4 F | OXYGEN SATURATION: 95 % | HEART RATE: 96 BPM | WEIGHT: 292.5 LBS | BODY MASS INDEX: 57.43 KG/M2 | DIASTOLIC BLOOD PRESSURE: 82 MMHG | RESPIRATION RATE: 18 BRPM

## 2021-05-14 VITALS
DIASTOLIC BLOOD PRESSURE: 83 MMHG | SYSTOLIC BLOOD PRESSURE: 127 MMHG | HEIGHT: 60 IN | TEMPERATURE: 97.6 F | HEART RATE: 94 BPM | BODY MASS INDEX: 29.86 KG/M2 | OXYGEN SATURATION: 98 % | WEIGHT: 152.12 LBS | RESPIRATION RATE: 24 BRPM

## 2021-05-14 NOTE — PROGRESS NOTES
Progress Note      Patient Name: Stephania Galvan   Patient ID: [id number]   Sex: Female   YOB: 1995    Primary Care Provider: Sai ZHANG   Referring Provider: Sai ZHANG    Visit Date: February 2, 2021    Provider: LEATHA Caraballo   Location: [facility]   Location Address: [address] [place], [place] [id number]   Location Phone: [phone]          Chief Complaint  · 6 week follow up on hypothyroidism, allergies, and migraines      History Of Present Illness  Stephania Galvan is a 25 year old /White female who presents for evaluation and treatment of:      6 week follow up on hypothyroidism, allergies, and migraines  lab due    c/o- states that she has not started Synthroid yet.. Previous task on 12/22/2020 shows that Laisha spoke with her, sent RX to [facility] and made her 6 week appt. Pt denies ever picking up RX.    pap-6/9/2020      allergist family allergy and asthma  states she started on symbicort inhaler  plans to start immunotherapy, waiting for serum to be ready     migraines doing better, only 2 in the past month     would like to change cetirizine to something cheaper       Past Medical History  Disease Name Date Onset Notes   Migraines --  --          Medication List  Name Date Started Instructions   Balcoltra 0.1 mg-0.02 mg (21)/36.5 mg(7) oral tablet  take 1 tablet by oral route once daily   fluticasone propionate 50 mcg/actuation nasal spray,suspension  spray 2 sprays (100 mcg) in each nostril by intranasal route once daily as needed   Imitrex 100 mg oral tablet 10/27/2020 take 1 tablet (100 mg) by oral route after onset of migraine; may repeat after 2 hours if headache returns   Singulair 10 mg Oral tablet 10/27/2020 take 1 tablet (10 mg) by oral route once daily in the evening for 90 days   Synthroid 25 mcg oral tablet 12/22/2020 take 1 tablet (25 mcg) by oral route once daily for 90 days    Topamax 50 mg oral tablet 12/01/2020 one tablet po BID         Allergy List  Allergen Name Date Reaction Notes   NO KNOWN DRUG ALLERGIES --  --  --          Family Medical History  Disease Name Relative/Age Notes   Stroke  --    Heart Disease  --    Diabetes Mellitus, Type II  --    Family history of breast cancer  --          Social History  Finding Status Start/Stop Quantity Notes   Alcohol Current some day --/-- --  --    Tobacco Never --/-- --  --          Immunizations  NameDate Admin Mfg Trade Name Lot Number Route Inj VIS Given VIS Publication   [id number]/15/2019 [facility] Fluzone Quadrivalent [id number] IM RD 10/15/2019    Comments: ndc [id number]         Review of Systems  · Constitutional  o Denies  o : fever, chills  · Eyes  o Denies  o : discharge from eye  · HENT  o Admits  o : nasal discharge, postnasal drainage  o Denies  o : ear pain, sore throat  · Cardiovascular  o Denies  o : chest Pain  · Respiratory  o Denies  o : frequent cough, shortness of breath  · Gastrointestinal  o Denies  o : nausea, vomiting, changes in bowel habits  · Genitourinary  o Denies  o : dysuria  · Neurologic  o Denies  o : headache, dizziness  · Allergic-Immunologic  o Admits  o : seasonal allergies      Vitals  Date Time BP Position Site L\R Cuff Size HR RR TEMP (F) WT  HT  BMI kg/m2 BSA m2 O2 Sat FR L/min FiO2 HC       07/23/2020 03:56 /71 Sitting    84 - R  97.7 273lbs 4oz 5'   53.36 2.29 100 %  21%    10/27/2020 09:04 /83 Sitting    94 - R 24 97.6 152lbs 2oz 5'   29.71 1.71 98 %  21%    12/01/2020 10:58 /72 Sitting    96 - R 22 98.4 275lbs 4oz 5'   53.76 2.3 98 %      02/02/2021 07:37 /63 Sitting    72 - R 20 97.6 282lbs 2oz 5'   55.1 2.33 98 %            Physical Examination  · Constitutional  o Appearance  o : well-nourished, in no acute distress  · Eyes  o Conjunctivae  o : conjunctivae normal  o Sclerae  o : sclerae white  o Pupils and Irises  o : pupils equal and round  o Eyelids/Ocular  Adnexae  o : eyelid appearance normal, no exudates present  · Ears, Nose, Mouth and Throat  o Ears  o :   § External Ears  § : external auditory canal appearance within normal limits  § Otoscopic Examination  § : tympanic membrane appearance within normal limits bilaterally  o Nose  o :   § External Nose  § : appearance normal  § Intranasal Exam  § : mucosa within normal limits  § Nasopharynx  § : no discharge present  o Oral Cavity  o :   § Oral Mucosa  § : oral mucosa normal  o Throat  o :   § Oropharynx  § : no inflammation or lesions present, tonsils within normal limits  · Neck  o Inspection/Palpation  o : normal appearance, trachea midline  o Thyroid  o : gland size normal, nontender  · Respiratory  o Respiratory Effort  o : breathing unlabored  o Inspection of Chest  o : normal appearance  o Auscultation of Lungs  o : normal breath sounds throughout inspiration and expiration  · Cardiovascular  o Heart  o :   § Auscultation of Heart  § : regular rate and rhythm, no murmurs  o Peripheral Vascular System  o :   § Carotid Arteries  § : no bruits present  § Extremities  § : no clubbing or edema  · Gastrointestinal  o Abdominal Examination  o : abdomen nontender to palpation, bowel sounds present  · Lymphatic  o Neck  o : no lymphadenopathy present  · Skin and Subcutaneous Tissue  o General Inspection  o : pink, warm, dry   · Neurologic  o Mental Status Examination  o :   § Orientation  § : grossly oriented to person, place and time  o Gait and Station  o : normal gait, able to stand without difficulty  · Psychiatric  o Judgement and Insight  o : judgment and insight intact  o Mood and Affect  o : mood normal, affect appropriate          Assessment  · Allergic rhinitis due to allergen     477.9/J30.9  continue to follow up allergist   · Hypothyroidism     244.9/E03.9  will start synthroid recheck in 6 weeks   · Migraines     346.90/G43.909  currently controlled on Topamax       Plan  · Orders  o ACO-39: Current  medications updated and reviewed (1159F, ) - - 02/02/2021  · Medications  o loratadine 10 mg oral tablet   SIG: take 1 tablet (10 mg) by oral route once daily for 90 days   DISP: (90) Tablet with 1 refills  Prescribed on 02/02/2021     o Imitrex 100 mg oral tablet   SIG: take 1 tablet (100 mg) by oral route after onset of migraine; may repeat after 2 hours if headache returns   DISP: (9) Tablet with 3 refills  Refilled on 02/02/2021     o Singulair 10 mg Oral tablet   SIG: take 1 tablet (10 mg) by oral route once daily in the evening for 90 days   DISP: (90) Tablet with 1 refills  Refilled on 02/02/2021     o Synthroid 25 mcg oral tablet   SIG: take 1 tablet (25 mcg) by oral route once daily for 90 days   DISP: (90) Tablet with 0 refills  Refilled on 02/02/2021     o Medications have been Reconciled  o Transition of Care or Provider Policy  · Instructions  o Patient was educated/instructed on their diagnosis, treatment and medications prior to discharge from the clinic today.  · Disposition  o will contact with diagnostics results  o FOLLOW UP PENDING RESULTS            Electronically Signed by: LEATHA Caraballo -Author on February 2, 2021 08:03:07 AM

## 2021-05-14 NOTE — PROGRESS NOTES
Progress Note      Patient Name: Stephania Galvan   Patient ID: [id number]   Sex: Female   YOB: 1995    Primary Care Provider: Sai ZHANG   Referring Provider: Sai ZHANG    Visit Date: April 1, 2021    Provider: LEATHA Caraballo   Location: [facility]   Location Address: [address] [place], [place] [address]   Location Phone: [phone]          Chief Complaint  · follow up for allergies, hypothyroidism and migraine headaches       History Of Present Illness  Stephania Galvan is a 25 year old /White female who presents for evaluation and treatment of:      follow up for allergies, hypothyroidism and migraine headaches   medication refills  labs due     States that Imitrex works well from migraines     pap-6/9/2020    family allergy and asthma cluster build immunotherapy       Past Medical History  Disease Name Date Onset Notes   Migraines --  --    Migraines 02/02/2021 --          Medication List  Name Date Started Instructions   Balcoltra 0.1 mg-0.02 mg (21)/36.5 mg(7) oral tablet  take 1 tablet by oral route once daily   fluticasone propionate 50 mcg/actuation nasal spray,suspension 04/01/2021 spray 2 sprays (100 mcg) in each nostril by intranasal route once daily as needed for 90 days   Imitrex 100 mg oral tablet 04/01/2021 take 1 tablet (100 mg) by oral route after onset of migraine; may repeat after 2 hours if headache returns   loratadine 10 mg oral tablet 04/01/2021 take 1 tablet (10 mg) by oral route once daily for 90 days   omeprazole 20 mg oral tablet,delayed release (DR/EC)  take 1 tablet by oral route daily   Singulair 10 mg Oral tablet 04/01/2021 take 1 tablet (10 mg) by oral route once daily in the evening for 90 days   Synthroid 25 mcg oral tablet 02/02/2021 take 1 tablet (25 mcg) by oral route once daily for 90 days   topiramate 50 mg oral tablet 04/01/2021 TAKE 1 TABLET BY MOUTH TWICE  A DAY         Allergy List  Allergen Name Date Reaction Notes   NO KNOWN DRUG ALLERGIES --  --  --          Family Medical History  Disease Name Relative/Age Notes   Stroke  --    Heart Disease  --    Diabetes Mellitus, Type II  --    Family history of breast cancer  --          Social History  Finding Status Start/Stop Quantity Notes   Alcohol Current some day --/-- --  --    Tobacco Never --/-- --  --          Immunizations  NameDate Admin Mfg Trade Name Lot Number Route Inj VIS Given VIS Publication   COVID [id number]/30/2021 NE Not Entered  NE NE 03/30/2021    Comments: Vaccine given at [facility]   Jrcpmtxqe17/15/2019 [facility] Fluzone Quadrivalent [id number] IM RD 10/15/2019    Comments: ndc [id number]         Review of Systems  · Constitutional  o Denies  o : fever, chills  · Eyes  o Denies  o : discharge from eye, changes in vision  · HENT  o Admits  o : headaches  o Denies  o : ear pain, sore throat  · Cardiovascular  o Denies  o : chest Pain, palpitations, edema (swelling)  · Respiratory  o Denies  o : frequent cough, shortness of breath  · Gastrointestinal  o Denies  o : nausea, vomiting, changes in bowel habits  · Genitourinary  o Denies  o : dysuria  · Neurologic  o Admits  o : headache  o Denies  o : dizziness  · Allergic-Immunologic  o Admits  o : seasonal allergies      Vitals  Date Time BP Position Site L\R Cuff Size HR RR TEMP (F) WT  HT  BMI kg/m2 BSA m2 O2 Sat FR L/min FiO2 HC       10/27/2020 09:04 /83 Sitting    94 - R 24 97.6 152lbs 2oz 5'   29.71 1.71 98 %  21%    12/01/2020 10:58 /72 Sitting    96 - R 22 98.4 275lbs 4oz 5'   53.76 2.3 98 %      02/02/2021 07:37 /63 Sitting    72 - R 20 97.6 282lbs 2oz 5'   55.1 2.33 98 %      04/01/2021 10:12 /82 Sitting    96 - R 18 98.4 292lbs 8oz 5'   57.12 2.37 95 %      04/01/2021 10:44 /64 Sitting                       Physical Examination  · Constitutional  o Appearance  o : well-nourished, in no acute  distress  · Eyes  o Conjunctivae  o : conjunctivae normal  o Sclerae  o : sclerae white  o Pupils and Irises  o : pupils equal and round  o Eyelids/Ocular Adnexae  o : eyelid appearance normal  · Ears, Nose, Mouth and Throat  o Ears  o :   § External Ears  § : external auditory canal appearance within normal limits  § Otoscopic Examination  § : tympanic membrane appearance within normal limits bilaterally  o Nose  o :   § External Nose  § : appearance normal  § Intranasal Exam  § : mucosa within normal limits  § Nasopharynx  § : no discharge present  o Throat  o :   § Oropharynx  § : no inflammation or lesions present, tonsils within normal limits  · Neck  o Inspection/Palpation  o : normal appearance, trachea midline  o Thyroid  o : gland size normal, nontender  · Respiratory  o Respiratory Effort  o : breathing unlabored  o Auscultation of Lungs  o : normal breath sounds throughout inspiration and expiration  · Cardiovascular  o Heart  o :   § Auscultation of Heart  § : regular rate and rhythm, no murmurs  o Peripheral Vascular System  o :   § Carotid Arteries  § : no bruits present  § Extremities  § : no clubbing or edema  · Gastrointestinal  o Abdominal Examination  o : abdomen nontender to palpation, bowel sounds present   · Lymphatic  o Neck  o : no lymphadenopathy present  · Skin and Subcutaneous Tissue  o General Inspection  o : pink, warm, dry   · Neurologic  o Mental Status Examination  o :   § Orientation  § : grossly oriented to person, place and time  o Gait and Station  o : normal gait, able to stand without difficulty  · Psychiatric  o Judgement and Insight  o : judgment and insight intact  o Mood and Affect  o : mood normal, affect appropriate          Assessment  · Allergic rhinitis due to allergen     477.9/J30.9  continue immunotherapy as directed by allergist   · Hypothyroidism     244.9/E03.9  will obtain tsh and call instructions for dosage   · Migraine headache     346.90/G43.909  currently  controlled   · Elevated blood pressure reading     796.2/R03.0  will recheck manually       Plan  · Orders  o ACO-39: Current medications updated and reviewed (, 1159F) - - 04/01/2021  · Medications  o fluticasone propionate 50 mcg/actuation nasal spray,suspension   SIG: spray 2 sprays (100 mcg) in each nostril by intranasal route once daily as needed for 90 days   DISP: (3) Bottle with 1 refills  Adjusted on 04/01/2021     o Imitrex 100 mg oral tablet   SIG: take 1 tablet (100 mg) by oral route after onset of migraine; may repeat after 2 hours if headache returns   DISP: (9) Tablet with 5 refills  Adjusted on 04/01/2021     o topiramate 50 mg oral tablet   SIG: TAKE 1 TABLET BY MOUTH TWICE A DAY   DISP: (90) Tablet with 1 refills  Adjusted on 04/01/2021     o loratadine 10 mg oral tablet   SIG: take 1 tablet (10 mg) by oral route once daily for 90 days   DISP: (90) Tablet with 1 refills  Refilled on 04/01/2021     o Singulair 10 mg Oral tablet   SIG: take 1 tablet (10 mg) by oral route once daily in the evening for 90 days   DISP: (90) Tablet with 1 refills  Refilled on 04/01/2021     o Synthroid 25 mcg oral tablet   SIG: take 1 tablet (25 mcg) by oral route once daily for 90 days   DISP: (90) Tablet with 1 refills  Refilled on 04/02/2021     o Topamax 50 mg oral tablet   SIG: one tablet po BID   DISP: (90) Tablet with 1 refills  Discontinued on 04/01/2021     o Medications have been Reconciled  o Transition of Care or Provider Policy  · Instructions  o Patient was educated/instructed on their diagnosis, treatment and medications prior to discharge from the clinic today.  · Disposition  o Follow Up in Office in 6 months or sooner if needed  o will contact with diagnostics results            Electronically Signed by: LEATHA Caraballo -Author on April 2, 2021 04:10:00 PM

## 2021-05-15 VITALS
TEMPERATURE: 97.7 F | BODY MASS INDEX: 53.64 KG/M2 | HEIGHT: 60 IN | HEART RATE: 84 BPM | DIASTOLIC BLOOD PRESSURE: 71 MMHG | WEIGHT: 273.25 LBS | SYSTOLIC BLOOD PRESSURE: 126 MMHG | OXYGEN SATURATION: 100 %

## 2021-05-15 VITALS
DIASTOLIC BLOOD PRESSURE: 52 MMHG | HEART RATE: 64 BPM | OXYGEN SATURATION: 98 % | HEIGHT: 60 IN | BODY MASS INDEX: 53.7 KG/M2 | WEIGHT: 273.5 LBS | SYSTOLIC BLOOD PRESSURE: 118 MMHG

## 2021-05-15 VITALS
HEIGHT: 60 IN | TEMPERATURE: 97.6 F | WEIGHT: 276.25 LBS | HEART RATE: 78 BPM | SYSTOLIC BLOOD PRESSURE: 127 MMHG | OXYGEN SATURATION: 97 % | BODY MASS INDEX: 54.23 KG/M2 | DIASTOLIC BLOOD PRESSURE: 73 MMHG

## 2021-05-15 VITALS
WEIGHT: 260 LBS | HEART RATE: 72 BPM | RESPIRATION RATE: 18 BRPM | TEMPERATURE: 98.4 F | BODY MASS INDEX: 51.04 KG/M2 | HEIGHT: 60 IN | SYSTOLIC BLOOD PRESSURE: 115 MMHG | DIASTOLIC BLOOD PRESSURE: 74 MMHG | OXYGEN SATURATION: 100 %

## 2021-05-15 VITALS
RESPIRATION RATE: 18 BRPM | DIASTOLIC BLOOD PRESSURE: 94 MMHG | SYSTOLIC BLOOD PRESSURE: 121 MMHG | BODY MASS INDEX: 51.63 KG/M2 | WEIGHT: 263 LBS | HEIGHT: 60 IN | OXYGEN SATURATION: 98 % | HEART RATE: 100 BPM | TEMPERATURE: 98 F

## 2021-07-29 ENCOUNTER — TELEPHONE (OUTPATIENT)
Dept: FAMILY MEDICINE CLINIC | Facility: CLINIC | Age: 26
End: 2021-07-29

## 2021-07-29 PROCEDURE — 87081 CULTURE SCREEN ONLY: CPT | Performed by: FAMILY MEDICINE

## 2021-07-29 PROCEDURE — 87147 CULTURE TYPE IMMUNOLOGIC: CPT | Performed by: FAMILY MEDICINE

## 2021-07-29 NOTE — TELEPHONE ENCOUNTER
I TOOK A WARM TRANSFER FROM THE HUB, PATIENT HAS SORE THROAT AND NOT FEELING GOOD. I ADVISED THAT [facility] DID NOT HAVE A SAME DAY APPOINTMENT AND [facility]/ACUTE CARE OF HER CHOOSE.

## 2021-08-02 ENCOUNTER — TELEPHONE (OUTPATIENT)
Dept: URGENT CARE | Facility: CLINIC | Age: 26
End: 2021-08-02

## 2021-08-02 NOTE — TELEPHONE ENCOUNTER
----- Message from LEATHA Richmond sent at 8/1/2021  7:10 PM EDT -----  Please call the patient regarding her abnormal result. She is positive for Group C strep. The antibiotic she was prescribed should take care of this. Complete course of antibiotic and follow up with PCP if no improvement.

## 2021-08-03 ENCOUNTER — TELEPHONE (OUTPATIENT)
Dept: URGENT CARE | Facility: CLINIC | Age: 26
End: 2021-08-03

## 2021-08-03 NOTE — TELEPHONE ENCOUNTER
----- Message from LEATHA Richmond sent at 8/1/2021  7:10 PM EDT -----  Please call the patient regarding her abnormal result. She is positive for Group C strep. The antibiotic she was prescribed should take care of this. Complete course of antibiotic and follow up with PCP if no improvement.  
Yes

## 2021-08-27 ENCOUNTER — TRANSCRIBE ORDERS (OUTPATIENT)
Dept: ADMINISTRATIVE | Facility: HOSPITAL | Age: 26
End: 2021-08-27

## 2021-08-27 DIAGNOSIS — R10.2 PELVIC PAIN: Primary | ICD-10-CM

## 2021-09-09 ENCOUNTER — HOSPITAL ENCOUNTER (OUTPATIENT)
Dept: ULTRASOUND IMAGING | Facility: HOSPITAL | Age: 26
Discharge: HOME | End: 2021-09-09
Admitting: OBSTETRICS & GYNECOLOGY

## 2021-09-09 DIAGNOSIS — R10.2 PELVIC PAIN: ICD-10-CM

## 2021-09-09 PROCEDURE — 76830 TRANSVAGINAL US NON-OB: CPT

## 2021-09-09 PROCEDURE — 76856 US EXAM PELVIC COMPLETE: CPT

## 2021-10-01 ENCOUNTER — OFFICE VISIT (OUTPATIENT)
Dept: FAMILY MEDICINE CLINIC | Facility: CLINIC | Age: 26
End: 2021-10-01

## 2021-10-01 VITALS
DIASTOLIC BLOOD PRESSURE: 74 MMHG | HEART RATE: 84 BPM | OXYGEN SATURATION: 98 % | WEIGHT: 271 LBS | HEIGHT: 60 IN | BODY MASS INDEX: 53.2 KG/M2 | TEMPERATURE: 98.1 F | SYSTOLIC BLOOD PRESSURE: 128 MMHG

## 2021-10-01 DIAGNOSIS — R11.0 NAUSEA: ICD-10-CM

## 2021-10-01 DIAGNOSIS — M79.672 LEFT FOOT PAIN: ICD-10-CM

## 2021-10-01 DIAGNOSIS — G43.109 MIGRAINE WITH AURA AND WITHOUT STATUS MIGRAINOSUS, NOT INTRACTABLE: ICD-10-CM

## 2021-10-01 DIAGNOSIS — Z23 INFLUENZA VACCINE ADMINISTERED: Primary | ICD-10-CM

## 2021-10-01 DIAGNOSIS — E78.2 MIXED HYPERLIPIDEMIA: ICD-10-CM

## 2021-10-01 DIAGNOSIS — R53.83 FATIGUE, UNSPECIFIED TYPE: ICD-10-CM

## 2021-10-01 DIAGNOSIS — R06.83 SNORING: ICD-10-CM

## 2021-10-01 DIAGNOSIS — E03.9 ACQUIRED HYPOTHYROIDISM: ICD-10-CM

## 2021-10-01 DIAGNOSIS — J30.2 SEASONAL ALLERGIES: ICD-10-CM

## 2021-10-01 PROBLEM — G43.909 MIGRAINES: Status: ACTIVE | Noted: 2021-02-02

## 2021-10-01 PROCEDURE — 99214 OFFICE O/P EST MOD 30 MIN: CPT | Performed by: NURSE PRACTITIONER

## 2021-10-01 PROCEDURE — 90686 IIV4 VACC NO PRSV 0.5 ML IM: CPT | Performed by: NURSE PRACTITIONER

## 2021-10-01 PROCEDURE — 90471 IMMUNIZATION ADMIN: CPT | Performed by: NURSE PRACTITIONER

## 2021-10-01 RX ORDER — OMEPRAZOLE 20 MG/1
20 CAPSULE, DELAYED RELEASE ORAL DAILY
Qty: 90 CAPSULE | Refills: 1 | Status: SHIPPED | OUTPATIENT
Start: 2021-10-01 | End: 2022-04-01 | Stop reason: SDUPTHER

## 2021-10-01 RX ORDER — TOPIRAMATE 50 MG/1
50 TABLET, FILM COATED ORAL 2 TIMES DAILY
Qty: 180 TABLET | Refills: 1 | Status: SHIPPED | OUTPATIENT
Start: 2021-10-01 | End: 2022-04-01 | Stop reason: SDUPTHER

## 2021-10-01 RX ORDER — MONTELUKAST SODIUM 10 MG/1
10 TABLET ORAL EVERY EVENING
Qty: 90 TABLET | Refills: 1 | Status: SHIPPED | OUTPATIENT
Start: 2021-10-01 | End: 2022-04-01 | Stop reason: SDUPTHER

## 2021-10-01 RX ORDER — LEVOTHYROXINE SODIUM 0.03 MG/1
25 TABLET ORAL
Qty: 90 TABLET | Refills: 1 | Status: SHIPPED | OUTPATIENT
Start: 2021-10-01 | End: 2021-10-01 | Stop reason: SDUPTHER

## 2021-10-01 RX ORDER — LEVOTHYROXINE SODIUM 0.03 MG/1
25 TABLET ORAL
Qty: 90 TABLET | Refills: 1 | Status: SHIPPED | OUTPATIENT
Start: 2021-10-01 | End: 2022-04-01 | Stop reason: SDUPTHER

## 2021-10-01 RX ORDER — LEVONORGESTREL AND ETHINYL ESTRADIOL 0.1-0.02MG
1 KIT ORAL DAILY
Qty: 90 TABLET | Refills: 3 | Status: SHIPPED | OUTPATIENT
Start: 2021-10-01 | End: 2022-04-01

## 2021-10-01 RX ORDER — ONDANSETRON 4 MG/1
4 TABLET, ORALLY DISINTEGRATING ORAL EVERY 8 HOURS PRN
Qty: 60 TABLET | Refills: 1 | Status: SHIPPED | OUTPATIENT
Start: 2021-10-01 | End: 2022-04-01 | Stop reason: SDUPTHER

## 2021-10-01 RX ORDER — LORATADINE 10 MG/1
10 TABLET ORAL DAILY
Qty: 90 TABLET | Refills: 1 | Status: SHIPPED | OUTPATIENT
Start: 2021-10-01 | End: 2022-04-01 | Stop reason: SDUPTHER

## 2021-10-01 NOTE — PROGRESS NOTES
Follow Up Office Visit      Patient Name: Stephania Galvan  : 1995   MRN: [id number]     Chief Complaint:    Chief Complaint   Patient presents with   • Allergies   • Hypothyroidism   • Headache   • Foot Pain     left, for the last 2 weeks       History of Present Illness: Stephania Galvan is a 25 y.o. female who is here today to follow up for 6 month follow up on Hypothyroidism, allergies, and headache.  Medication refills    C/O left foot pain x 2 weeks, feels like  Pins and needles after walking almost 3 miles [place] then felt pain     Currently allergist family allergy and asthma receiving immunotherapy     Pt c/o nausea with migraines only 2 in the past month     Last pap 2020    Subjective      Review of Systems:   Review of Systems   Constitutional: Positive for fatigue. Negative for chills and fever.   HENT: Negative for ear pain, sinus pain and sore throat.    Eyes: Positive for visual disturbance. Negative for itching.   Respiratory: Negative for cough.    Cardiovascular: Negative for chest pain.   Gastrointestinal: Positive for nausea. Negative for abdominal pain, diarrhea and vomiting.   Genitourinary: Negative for dysuria.   Musculoskeletal: Negative for myalgias.   Skin: Negative for rash.   Allergic/Immunologic: Positive for environmental allergies.   Neurological: Positive for headaches.   Psychiatric/Behavioral: Positive for sleep disturbance.        Past Medical History:   Past Medical History:   Diagnosis Date   • Allergies    • Migraines 2021       Past Surgical History:   Past Surgical History:   Procedure Laterality Date   • DENTAL PROCEDURE         Family History:   Family History   Problem Relation Age of Onset   • Stroke Other    • Heart disease Other    • Diabetes type II Other    • Breast cancer Other        Social History:   Social History     Socioeconomic History   • Marital status: Single     Spouse name: Not on file   • Number of children: Not on  "file   • Years of education: Not on file   • Highest education level: Not on file   Tobacco Use   • Smoking status: Never Smoker   • Smokeless tobacco: Never Used   Vaping Use   • Vaping Use: Never used   Substance and Sexual Activity   • Alcohol use: Yes     Comment: Current some day   • Drug use: Never   • Sexual activity: Defer       Medications:     Current Outpatient Medications:   •  Levonorgest-Eth Estrad-Fe Bisg (Balcoltra) 0.1-20 MG-MCG(21) per tablet, Take 1 tablet by mouth Daily., Disp: 90 tablet, Rfl: 3  •  levothyroxine (SYNTHROID, LEVOTHROID) 25 MCG tablet, Take 1 tablet by mouth Every Morning., Disp: 90 tablet, Rfl: 1  •  loratadine (CLARITIN) 10 MG tablet, Take 1 tablet by mouth Daily., Disp: 90 tablet, Rfl: 1  •  montelukast (SINGULAIR) 10 MG tablet, Take 1 tablet by mouth Every Evening., Disp: 90 tablet, Rfl: 1  •  omeprazole (priLOSEC) 20 MG capsule, Take 1 capsule by mouth Daily., Disp: 90 capsule, Rfl: 1  •  ondansetron ODT (Zofran ODT) 4 MG disintegrating tablet, Place 1 tablet on the tongue Every 8 (Eight) Hours As Needed for Nausea or Vomiting., Disp: 60 tablet, Rfl: 1  •  ProAir  (90 Base) MCG/ACT inhaler, Inhale 2 puffs Every 4 (Four) Hours As Needed for Wheezing or Shortness of Air., Disp: 18 g, Rfl: 2  •  topiramate (TOPAMAX) 50 MG tablet, Take 1 tablet by mouth 2 (Two) Times a Day., Disp: 180 tablet, Rfl: 1    Allergies:   No Known Allergies      Objective     Physical Exam:  Vital Signs:   Vitals:    10/01/21 0706   BP: 128/74   Pulse: 84   Temp: 98.1 °F (36.7 °C)   SpO2: 98%   Weight: 123 kg (271 lb)   Height: 152.4 cm (60\")     Body mass index is 52.93 kg/m².     Physical Exam  HENT:      Right Ear: Tympanic membrane normal.      Left Ear: Tympanic membrane normal.      Nose: Nose normal.      Mouth/Throat:      Mouth: Mucous membranes are moist.   Eyes:      Pupils: Pupils are equal, round, and reactive to light.   Neck:      Vascular: No carotid bruit.   Cardiovascular:      " Rate and Rhythm: Normal rate and regular rhythm.      Heart sounds: Normal heart sounds.   Pulmonary:      Effort: Pulmonary effort is normal.      Breath sounds: Normal breath sounds.   Abdominal:      General: Bowel sounds are normal.      Palpations: Abdomen is soft.   Musculoskeletal:      Right lower leg: No edema.      Left lower leg: No edema.      Comments: Dorsal surface left  to palpation, no erythema, no edema   Lymphadenopathy:      Cervical: No cervical adenopathy.   Skin:     General: Skin is warm and dry.      Capillary Refill: Capillary refill takes less than 2 seconds.   Neurological:      Mental Status: She is alert and oriented to person, place, and time.   Psychiatric:         Mood and Affect: Mood normal.         Behavior: Behavior normal.             Assessment / Plan      Assessment/Plan:   Diagnoses and all orders for this visit:    1. Migraine with aura and without status migrainosus, not intractable    2. Acquired hypothyroidism  -     TSH    3. Mixed hyperlipidemia  -     CBC Auto Differential  -     Comprehensive Metabolic Panel  -     Lipid Panel    4. Seasonal allergies    5. Fatigue, unspecified type  -     Vitamin B12  -     Iron Profile  -     Ferritin    6. Snoring  -     Ambulatory Referral to Sleep Medicine    7. Nausea    8. Left foot pain  -     XR Foot 3+ View Left; Future    Other orders  -     Discontinue: levothyroxine (SYNTHROID, LEVOTHROID) 25 MCG tablet; Take 1 tablet by mouth Every Morning.  Dispense: 90 tablet; Refill: 1  -     omeprazole (priLOSEC) 20 MG capsule; Take 1 capsule by mouth Daily.  Dispense: 90 capsule; Refill: 1  -     topiramate (TOPAMAX) 50 MG tablet; Take 1 tablet by mouth 2 (Two) Times a Day.  Dispense: 180 tablet; Refill: 1  -     montelukast (SINGULAIR) 10 MG tablet; Take 1 tablet by mouth Every Evening.  Dispense: 90 tablet; Refill: 1  -     loratadine (CLARITIN) 10 MG tablet; Take 1 tablet by mouth Daily.  Dispense: 90 tablet; Refill:  "1  -     levothyroxine (SYNTHROID, LEVOTHROID) 25 MCG tablet; Take 1 tablet by mouth Every Morning.  Dispense: 90 tablet; Refill: 1  -     ProAir  (90 Base) MCG/ACT inhaler; Inhale 2 puffs Every 4 (Four) Hours As Needed for Wheezing or Shortness of Air.  Dispense: 18 g; Refill: 2  -     Levonorgest-Eth Estrad-Fe Bisg (Balcoltra) 0.1-20 MG-MCG(21) per tablet; Take 1 tablet by mouth Daily.  Dispense: 90 tablet; Refill: 3  -     ondansetron ODT (Zofran ODT) 4 MG disintegrating tablet; Place 1 tablet on the tongue Every 8 (Eight) Hours As Needed for Nausea or Vomiting.  Dispense: 60 tablet; Refill: 1               Follow Up:   Return in about 6 months (around 4/1/2022).    LEATHA Daniels    \"Please note that portions of this note were completed with a voice recognition program.\"    "

## 2021-10-05 ENCOUNTER — LAB (OUTPATIENT)
Dept: LAB | Facility: HOSPITAL | Age: 26
End: 2021-10-05

## 2021-10-05 LAB
ALBUMIN SERPL-MCNC: 4.4 G/DL (ref 3.5–5.2)
ALBUMIN/GLOB SERPL: 2.1 G/DL
ALP SERPL-CCNC: 74 U/L (ref 39–117)
ALT SERPL W P-5'-P-CCNC: 19 U/L (ref 1–33)
ANION GAP SERPL CALCULATED.3IONS-SCNC: 12.5 MMOL/L (ref 5–15)
AST SERPL-CCNC: 16 U/L (ref 1–32)
BASOPHILS # BLD AUTO: 0.06 10*3/MM3 (ref 0–0.2)
BASOPHILS NFR BLD AUTO: 0.8 % (ref 0–1.5)
BILIRUB SERPL-MCNC: 0.3 MG/DL (ref 0–1.2)
BUN SERPL-MCNC: 9 MG/DL (ref 6–20)
BUN/CREAT SERPL: 11.1 (ref 7–25)
CALCIUM SPEC-SCNC: 9.5 MG/DL (ref 8.6–10.5)
CHLORIDE SERPL-SCNC: 105 MMOL/L (ref 98–107)
CHOLEST SERPL-MCNC: 184 MG/DL (ref 0–200)
CO2 SERPL-SCNC: 19.5 MMOL/L (ref 22–29)
CREAT SERPL-MCNC: 0.81 MG/DL (ref 0.57–1)
DEPRECATED RDW RBC AUTO: 38.6 FL (ref 37–54)
EOSINOPHIL # BLD AUTO: 0.15 10*3/MM3 (ref 0–0.4)
EOSINOPHIL NFR BLD AUTO: 2 % (ref 0.3–6.2)
ERYTHROCYTE [DISTWIDTH] IN BLOOD BY AUTOMATED COUNT: 12.6 % (ref 12.3–15.4)
FERRITIN SERPL-MCNC: 89.3 NG/ML (ref 13–150)
GFR SERPL CREATININE-BSD FRML MDRD: 86 ML/MIN/1.73
GLOBULIN UR ELPH-MCNC: 2.1 GM/DL
GLUCOSE SERPL-MCNC: 96 MG/DL (ref 65–99)
HCT VFR BLD AUTO: 38.2 % (ref 34–46.6)
HDLC SERPL-MCNC: 49 MG/DL (ref 40–60)
HGB BLD-MCNC: 12.8 G/DL (ref 12–15.9)
IMM GRANULOCYTES # BLD AUTO: 0.02 10*3/MM3 (ref 0–0.05)
IMM GRANULOCYTES NFR BLD AUTO: 0.3 % (ref 0–0.5)
IRON 24H UR-MRATE: 46 MCG/DL (ref 37–145)
IRON SATN MFR SERPL: 12 % (ref 20–50)
LDLC SERPL CALC-MCNC: 109 MG/DL (ref 0–100)
LDLC/HDLC SERPL: 2.16 {RATIO}
LYMPHOCYTES # BLD AUTO: 2.1 10*3/MM3 (ref 0.7–3.1)
LYMPHOCYTES NFR BLD AUTO: 28.2 % (ref 19.6–45.3)
MCH RBC QN AUTO: 28.6 PG (ref 26.6–33)
MCHC RBC AUTO-ENTMCNC: 33.5 G/DL (ref 31.5–35.7)
MCV RBC AUTO: 85.3 FL (ref 79–97)
MONOCYTES # BLD AUTO: 0.39 10*3/MM3 (ref 0.1–0.9)
MONOCYTES NFR BLD AUTO: 5.2 % (ref 5–12)
NEUTROPHILS NFR BLD AUTO: 4.72 10*3/MM3 (ref 1.7–7)
NEUTROPHILS NFR BLD AUTO: 63.5 % (ref 42.7–76)
NRBC BLD AUTO-RTO: 0 /100 WBC (ref 0–0.2)
PLATELET # BLD AUTO: 316 10*3/MM3 (ref 140–450)
PMV BLD AUTO: 9.6 FL (ref 6–12)
POTASSIUM SERPL-SCNC: 3.7 MMOL/L (ref 3.5–5.2)
PROT SERPL-MCNC: 6.5 G/DL (ref 6–8.5)
RBC # BLD AUTO: 4.48 10*6/MM3 (ref 3.77–5.28)
SODIUM SERPL-SCNC: 137 MMOL/L (ref 136–145)
TIBC SERPL-MCNC: 392 MCG/DL (ref 298–536)
TRANSFERRIN SERPL-MCNC: 263 MG/DL (ref 200–360)
TRIGL SERPL-MCNC: 146 MG/DL (ref 0–150)
TSH SERPL DL<=0.05 MIU/L-ACNC: 1.97 UIU/ML (ref 0.27–4.2)
VIT B12 BLD-MCNC: 295 PG/ML (ref 211–946)
VLDLC SERPL-MCNC: 26 MG/DL (ref 5–40)
WBC # BLD AUTO: 7.44 10*3/MM3 (ref 3.4–10.8)

## 2021-10-05 PROCEDURE — 82607 VITAMIN B-12: CPT | Performed by: NURSE PRACTITIONER

## 2021-10-05 PROCEDURE — 80050 GENERAL HEALTH PANEL: CPT | Performed by: NURSE PRACTITIONER

## 2021-10-05 PROCEDURE — 82728 ASSAY OF FERRITIN: CPT | Performed by: NURSE PRACTITIONER

## 2021-10-05 PROCEDURE — 80061 LIPID PANEL: CPT | Performed by: NURSE PRACTITIONER

## 2021-10-05 PROCEDURE — 84466 ASSAY OF TRANSFERRIN: CPT | Performed by: NURSE PRACTITIONER

## 2021-10-05 PROCEDURE — 83540 ASSAY OF IRON: CPT | Performed by: NURSE PRACTITIONER

## 2021-10-06 ENCOUNTER — HOSPITAL ENCOUNTER (OUTPATIENT)
Dept: GENERAL RADIOLOGY | Facility: HOSPITAL | Age: 26
Discharge: HOME | End: 2021-10-06
Admitting: NURSE PRACTITIONER

## 2021-10-06 DIAGNOSIS — M79.672 LEFT FOOT PAIN: ICD-10-CM

## 2021-10-06 PROCEDURE — 73630 X-RAY EXAM OF FOOT: CPT

## 2021-11-02 ENCOUNTER — TELEPHONE (OUTPATIENT)
Dept: FAMILY MEDICINE CLINIC | Facility: CLINIC | Age: 26
End: 2021-11-02

## 2021-11-02 RX ORDER — FLUTICASONE PROPIONATE 50 MCG
2 SPRAY, SUSPENSION (ML) NASAL DAILY
Qty: 15.8 ML | Refills: 1 | Status: SHIPPED | OUTPATIENT
Start: 2021-11-02 | End: 2022-04-01 | Stop reason: SDUPTHER

## 2021-11-02 RX ORDER — FLUTICASONE PROPIONATE 50 MCG
2 SPRAY, SUSPENSION (ML) NASAL DAILY
COMMUNITY
End: 2021-11-02 | Stop reason: SDUPTHER

## 2021-11-02 NOTE — TELEPHONE ENCOUNTER
Caller: Stephania Galvan    Relationship: Self    Medication requested (name and dosage): fluticasone 50 MCG     Pharmacy where request should be sent: [facility]     Additional details provided by patient:     Best call back number: [phone]     Does the patient have less than a 3 day supply:  [x] Yes  [] No

## 2021-11-26 ENCOUNTER — TELEPHONE (OUTPATIENT)
Dept: URGENT CARE | Facility: CLINIC | Age: 26
End: 2021-11-26

## 2021-11-26 PROCEDURE — 87635 SARS-COV-2 COVID-19 AMP PRB: CPT | Performed by: FAMILY MEDICINE

## 2021-11-27 NOTE — TELEPHONE ENCOUNTER
----- Message from Gregoria Pierre MD sent at 11/26/2021  7:12 PM EST -----  Please call the patient regarding negative Covid PCR test if not reviewed.

## 2022-04-01 ENCOUNTER — OFFICE VISIT (OUTPATIENT)
Dept: FAMILY MEDICINE CLINIC | Facility: CLINIC | Age: 27
End: 2022-04-01

## 2022-04-01 VITALS
HEIGHT: 60 IN | TEMPERATURE: 98.4 F | HEART RATE: 80 BPM | WEIGHT: 268 LBS | OXYGEN SATURATION: 98 % | SYSTOLIC BLOOD PRESSURE: 106 MMHG | DIASTOLIC BLOOD PRESSURE: 54 MMHG | BODY MASS INDEX: 52.61 KG/M2

## 2022-04-01 DIAGNOSIS — E03.9 ACQUIRED HYPOTHYROIDISM: ICD-10-CM

## 2022-04-01 DIAGNOSIS — R73.01 IMPAIRED FASTING GLUCOSE: ICD-10-CM

## 2022-04-01 DIAGNOSIS — E66.01 CLASS 3 SEVERE OBESITY DUE TO EXCESS CALORIES WITHOUT SERIOUS COMORBIDITY WITH BODY MASS INDEX (BMI) OF 50.0 TO 59.9 IN ADULT: ICD-10-CM

## 2022-04-01 DIAGNOSIS — J45.20 MILD INTERMITTENT ASTHMA WITHOUT COMPLICATION: ICD-10-CM

## 2022-04-01 DIAGNOSIS — E78.2 MIXED HYPERLIPIDEMIA: Primary | ICD-10-CM

## 2022-04-01 DIAGNOSIS — G43.109 MIGRAINE WITH AURA AND WITHOUT STATUS MIGRAINOSUS, NOT INTRACTABLE: ICD-10-CM

## 2022-04-01 DIAGNOSIS — E61.1 IRON DEFICIENCY: ICD-10-CM

## 2022-04-01 DIAGNOSIS — J30.2 SEASONAL ALLERGIES: ICD-10-CM

## 2022-04-01 DIAGNOSIS — R11.0 NAUSEA: ICD-10-CM

## 2022-04-01 PROBLEM — E66.813 CLASS 3 SEVERE OBESITY DUE TO EXCESS CALORIES WITHOUT SERIOUS COMORBIDITY WITH BODY MASS INDEX (BMI) OF 50.0 TO 59.9 IN ADULT: Status: ACTIVE | Noted: 2022-04-01

## 2022-04-01 LAB
ALBUMIN SERPL-MCNC: 4.1 G/DL (ref 3.5–5.2)
ALBUMIN/GLOB SERPL: 1.5 G/DL
ALP SERPL-CCNC: 74 U/L (ref 39–117)
ALT SERPL W P-5'-P-CCNC: 15 U/L (ref 1–33)
ANION GAP SERPL CALCULATED.3IONS-SCNC: 14.1 MMOL/L (ref 5–15)
AST SERPL-CCNC: 12 U/L (ref 1–32)
BASOPHILS # BLD AUTO: 0.05 10*3/MM3 (ref 0–0.2)
BASOPHILS NFR BLD AUTO: 0.7 % (ref 0–1.5)
BILIRUB SERPL-MCNC: 0.4 MG/DL (ref 0–1.2)
BUN SERPL-MCNC: 9 MG/DL (ref 6–20)
BUN/CREAT SERPL: 10.7 (ref 7–25)
CALCIUM SPEC-SCNC: 9.4 MG/DL (ref 8.6–10.5)
CHLORIDE SERPL-SCNC: 106 MMOL/L (ref 98–107)
CHOLEST SERPL-MCNC: 206 MG/DL (ref 0–200)
CO2 SERPL-SCNC: 20.9 MMOL/L (ref 22–29)
CREAT SERPL-MCNC: 0.84 MG/DL (ref 0.57–1)
DEPRECATED RDW RBC AUTO: 39.6 FL (ref 37–54)
EGFRCR SERPLBLD CKD-EPI 2021: 98.4 ML/MIN/1.73
EOSINOPHIL # BLD AUTO: 0.19 10*3/MM3 (ref 0–0.4)
EOSINOPHIL NFR BLD AUTO: 2.8 % (ref 0.3–6.2)
ERYTHROCYTE [DISTWIDTH] IN BLOOD BY AUTOMATED COUNT: 12.8 % (ref 12.3–15.4)
FERRITIN SERPL-MCNC: 79.9 NG/ML (ref 13–150)
GLOBULIN UR ELPH-MCNC: 2.8 GM/DL
GLUCOSE SERPL-MCNC: 99 MG/DL (ref 65–99)
HBA1C MFR BLD: 5.6 % (ref 4.8–5.6)
HCT VFR BLD AUTO: 42.1 % (ref 34–46.6)
HDLC SERPL-MCNC: 54 MG/DL (ref 40–60)
HGB BLD-MCNC: 14 G/DL (ref 12–15.9)
IMM GRANULOCYTES # BLD AUTO: 0.02 10*3/MM3 (ref 0–0.05)
IMM GRANULOCYTES NFR BLD AUTO: 0.3 % (ref 0–0.5)
IRON 24H UR-MRATE: 51 MCG/DL (ref 37–145)
IRON SATN MFR SERPL: 12 % (ref 20–50)
LDLC SERPL CALC-MCNC: 120 MG/DL (ref 0–100)
LDLC/HDLC SERPL: 2.13 {RATIO}
LYMPHOCYTES # BLD AUTO: 2.05 10*3/MM3 (ref 0.7–3.1)
LYMPHOCYTES NFR BLD AUTO: 30.5 % (ref 19.6–45.3)
MCH RBC QN AUTO: 28.3 PG (ref 26.6–33)
MCHC RBC AUTO-ENTMCNC: 33.3 G/DL (ref 31.5–35.7)
MCV RBC AUTO: 85.2 FL (ref 79–97)
MONOCYTES # BLD AUTO: 0.42 10*3/MM3 (ref 0.1–0.9)
MONOCYTES NFR BLD AUTO: 6.3 % (ref 5–12)
NEUTROPHILS NFR BLD AUTO: 3.99 10*3/MM3 (ref 1.7–7)
NEUTROPHILS NFR BLD AUTO: 59.4 % (ref 42.7–76)
NRBC BLD AUTO-RTO: 0 /100 WBC (ref 0–0.2)
PLATELET # BLD AUTO: 330 10*3/MM3 (ref 140–450)
PMV BLD AUTO: 9.5 FL (ref 6–12)
POTASSIUM SERPL-SCNC: 3.6 MMOL/L (ref 3.5–5.2)
PROT SERPL-MCNC: 6.9 G/DL (ref 6–8.5)
RBC # BLD AUTO: 4.94 10*6/MM3 (ref 3.77–5.28)
SODIUM SERPL-SCNC: 141 MMOL/L (ref 136–145)
TIBC SERPL-MCNC: 422 MCG/DL (ref 298–536)
TRANSFERRIN SERPL-MCNC: 283 MG/DL (ref 200–360)
TRIGL SERPL-MCNC: 185 MG/DL (ref 0–150)
TSH SERPL DL<=0.05 MIU/L-ACNC: 4.15 UIU/ML (ref 0.27–4.2)
VLDLC SERPL-MCNC: 32 MG/DL (ref 5–40)
WBC NRBC COR # BLD: 6.72 10*3/MM3 (ref 3.4–10.8)

## 2022-04-01 PROCEDURE — 83540 ASSAY OF IRON: CPT | Performed by: NURSE PRACTITIONER

## 2022-04-01 PROCEDURE — 99214 OFFICE O/P EST MOD 30 MIN: CPT | Performed by: NURSE PRACTITIONER

## 2022-04-01 PROCEDURE — 84466 ASSAY OF TRANSFERRIN: CPT | Performed by: NURSE PRACTITIONER

## 2022-04-01 PROCEDURE — 80050 GENERAL HEALTH PANEL: CPT | Performed by: NURSE PRACTITIONER

## 2022-04-01 PROCEDURE — 80061 LIPID PANEL: CPT | Performed by: NURSE PRACTITIONER

## 2022-04-01 PROCEDURE — 83036 HEMOGLOBIN GLYCOSYLATED A1C: CPT | Performed by: NURSE PRACTITIONER

## 2022-04-01 PROCEDURE — 82728 ASSAY OF FERRITIN: CPT | Performed by: NURSE PRACTITIONER

## 2022-04-01 RX ORDER — PRUCALOPRIDE 2 MG/1
1 TABLET, FILM COATED ORAL DAILY
Qty: 90 TABLET | Refills: 0 | Status: CANCELLED | OUTPATIENT
Start: 2022-04-01

## 2022-04-01 RX ORDER — LORATADINE 10 MG/1
10 TABLET ORAL DAILY
Qty: 90 TABLET | Refills: 1 | Status: SHIPPED | OUTPATIENT
Start: 2022-04-01 | End: 2023-01-13

## 2022-04-01 RX ORDER — TOPIRAMATE 50 MG/1
50 TABLET, FILM COATED ORAL 2 TIMES DAILY
Qty: 180 TABLET | Refills: 1 | Status: SHIPPED | OUTPATIENT
Start: 2022-04-01 | End: 2022-10-13 | Stop reason: SDUPTHER

## 2022-04-01 RX ORDER — DROSPIRENONE AND ETHINYL ESTRADIOL 0.03MG-3MG
1 KIT ORAL DAILY
COMMUNITY
Start: 2022-01-22 | End: 2022-07-07

## 2022-04-01 RX ORDER — PRUCALOPRIDE 2 MG/1
TABLET, FILM COATED ORAL
COMMUNITY
End: 2022-04-01

## 2022-04-01 RX ORDER — BUDESONIDE AND FORMOTEROL FUMARATE DIHYDRATE 160; 4.5 UG/1; UG/1
2 AEROSOL RESPIRATORY (INHALATION)
COMMUNITY

## 2022-04-01 RX ORDER — GALCANEZUMAB 120 MG/ML
120 INJECTION, SOLUTION SUBCUTANEOUS ONCE
Qty: 1.12 ML | Refills: 11 | Status: SHIPPED | OUTPATIENT
Start: 2022-04-01 | End: 2022-04-01

## 2022-04-01 RX ORDER — OMEPRAZOLE 20 MG/1
20 CAPSULE, DELAYED RELEASE ORAL DAILY
Qty: 90 CAPSULE | Refills: 1 | Status: SHIPPED | OUTPATIENT
Start: 2022-04-01

## 2022-04-01 RX ORDER — MONTELUKAST SODIUM 10 MG/1
10 TABLET ORAL EVERY EVENING
Qty: 90 TABLET | Refills: 1 | Status: SHIPPED | OUTPATIENT
Start: 2022-04-01 | End: 2022-10-21

## 2022-04-01 RX ORDER — ONDANSETRON 4 MG/1
4 TABLET, ORALLY DISINTEGRATING ORAL EVERY 8 HOURS PRN
Qty: 60 TABLET | Refills: 1 | Status: SHIPPED | OUTPATIENT
Start: 2022-04-01 | End: 2022-10-21

## 2022-04-01 RX ORDER — LEVOTHYROXINE SODIUM 0.03 MG/1
25 TABLET ORAL
Qty: 90 TABLET | Refills: 1 | Status: SHIPPED | OUTPATIENT
Start: 2022-04-01 | End: 2022-08-01

## 2022-04-01 RX ORDER — FLUTICASONE PROPIONATE 50 MCG
2 SPRAY, SUSPENSION (ML) NASAL DAILY
Qty: 15.8 ML | Refills: 1 | Status: SHIPPED | OUTPATIENT
Start: 2022-04-01 | End: 2022-06-01

## 2022-04-01 NOTE — PROGRESS NOTES
Follow Up Office Visit      Patient Name: Stephania Galvan  : 1995   MRN: [id number]     Chief Complaint:    Chief Complaint   Patient presents with   • Hypothyroidism   • Allergic Rhinitis   • Hyperlipidemia   • Asthma       History of Present Illness: Stephania Galvan is a 26 y.o. female who is here today to follow up for allergic rhinitis, hypothyroidism, migraines, iron deficiency, hyperlipidemia, impaired fasting glucose and asthma    GYN dr tobar 2020 providing BCP     Pt c/o migraines uncontrolled for the past 3 to 4 months having 8 per month that last up to 3 days, on topamax and imitrex prn   +nausea  +photophobia      Allergist family allergy and asthma immunotherapy x1 week     Subjective      Review of Systems:   Review of Systems   Constitutional: Negative for chills and fever.   HENT: Negative for ear pain, postnasal drip, sinus pain and sore throat.    Eyes: Negative for visual disturbance.   Respiratory: Negative for cough.    Cardiovascular: Negative for chest pain.   Gastrointestinal: Positive for nausea. Negative for abdominal pain, diarrhea and vomiting.   Endocrine: Negative for polydipsia and polyuria.   Genitourinary: Negative for dysuria.   Musculoskeletal: Negative for myalgias.   Allergic/Immunologic: Positive for environmental allergies.   Neurological: Positive for headaches.        Past Medical History:   Past Medical History:   Diagnosis Date   • Allergies    • Migraines 2021       Past Surgical History:   Past Surgical History:   Procedure Laterality Date   • DENTAL PROCEDURE         Family History:   Family History   Problem Relation Age of Onset   • Stroke Other    • Heart disease Other    • Diabetes type II Other    • Breast cancer Other        Social History:   Social History     Socioeconomic History   • Marital status: Single   Tobacco Use   • Smoking status: Never Smoker   • Smokeless tobacco: Never Used   Vaping Use   • Vaping Use: Never used   Substance  "and Sexual Activity   • Alcohol use: Yes     Comment: occ   • Drug use: Never       Medications:     Current Outpatient Medications:   •  fluticasone (FLONASE) 50 MCG/ACT nasal spray, 2 sprays into the nostril(s) as directed by provider Daily., Disp: 15.8 mL, Rfl: 1  •  levothyroxine (SYNTHROID, LEVOTHROID) 25 MCG tablet, Take 1 tablet by mouth Every Morning., Disp: 90 tablet, Rfl: 1  •  loratadine (CLARITIN) 10 MG tablet, Take 1 tablet by mouth Daily., Disp: 90 tablet, Rfl: 1  •  montelukast (SINGULAIR) 10 MG tablet, Take 1 tablet by mouth Every Evening., Disp: 90 tablet, Rfl: 1  •  omeprazole (priLOSEC) 20 MG capsule, Take 1 capsule by mouth Daily., Disp: 90 capsule, Rfl: 1  •  ondansetron ODT (Zofran ODT) 4 MG disintegrating tablet, Place 1 tablet on the tongue Every 8 (Eight) Hours As Needed for Nausea or Vomiting., Disp: 60 tablet, Rfl: 1  •  topiramate (TOPAMAX) 50 MG tablet, Take 1 tablet by mouth 2 (Two) Times a Day., Disp: 180 tablet, Rfl: 1  •  budesonide-formoterol (SYMBICORT) 160-4.5 MCG/ACT inhaler, Inhale 2 puffs 2 (Two) Times a Day., Disp: , Rfl:   •  galcanezumab-gnlm (Emgality) 120 MG/ML auto-injector pen, Inject 1 mL under the skin into the appropriate area as directed 1 (One) Time for 1 dose., Disp: 1.12 mL, Rfl: 11  •  ProAir  (90 Base) MCG/ACT inhaler, Inhale 2 puffs Every 4 (Four) Hours As Needed for Wheezing or Shortness of Air., Disp: 18 g, Rfl: 2  •  Rimegepant Sulfate (NURTEC) 75 MG tablet dispersible tablet, Take 1 tablet by mouth Daily As Needed (migraine)., Disp: 8 tablet, Rfl: 5  •  Taya 3-0.03 MG per tablet, Take 1 tablet by mouth Daily., Disp: , Rfl:     Allergies:   No Known Allergies      PHQ-2 Total Score: 0   PHQ-9 Total Score: 0     Objective     Physical Exam:  Vital Signs:   Vitals:    04/01/22 0709   BP: 106/54   Pulse: 80   Temp: 98.4 °F (36.9 °C)   SpO2: 98%   Weight: 122 kg (268 lb)   Height: 152.4 cm (60\")     Body mass index is 52.34 kg/m².     Physical " Exam  HENT:      Right Ear: Tympanic membrane normal.      Left Ear: Tympanic membrane normal.      Nose: Nose normal.      Mouth/Throat:      Mouth: Mucous membranes are moist.   Eyes:      Pupils: Pupils are equal, round, and reactive to light.   Neck:      Vascular: No carotid bruit.   Cardiovascular:      Rate and Rhythm: Normal rate and regular rhythm.      Heart sounds: Normal heart sounds. No murmur heard.  Pulmonary:      Effort: Pulmonary effort is normal.   Abdominal:      General: Bowel sounds are normal.      Palpations: Abdomen is soft.   Musculoskeletal:      Right lower leg: No edema.      Left lower leg: No edema.   Skin:     General: Skin is warm and dry.   Neurological:      Mental Status: She is alert.   Psychiatric:         Mood and Affect: Mood normal.         Behavior: Behavior normal.             Assessment / Plan      Assessment/Plan:   Diagnoses and all orders for this visit:    1. Mixed hyperlipidemia (Primary)  -     Comprehensive Metabolic Panel  -     Lipid Panel    2. Acquired hypothyroidism  -     TSH    3. Seasonal allergies  -     CBC Auto Differential    4. Migraine with aura and without status migrainosus, not intractable    5. Nausea    6. Mild intermittent asthma without complication    7. Impaired fasting glucose  -     Hemoglobin A1c    8. Iron deficiency  -     Iron Profile  -     Ferritin    9. Class 3 severe obesity due to excess calories without serious comorbidity with body mass index (BMI) of 50.0 to 59.9 in adult (HCC)    Other orders  -     topiramate (TOPAMAX) 50 MG tablet; Take 1 tablet by mouth 2 (Two) Times a Day.  Dispense: 180 tablet; Refill: 1  -     levothyroxine (SYNTHROID, LEVOTHROID) 25 MCG tablet; Take 1 tablet by mouth Every Morning.  Dispense: 90 tablet; Refill: 1  -     omeprazole (priLOSEC) 20 MG capsule; Take 1 capsule by mouth Daily.  Dispense: 90 capsule; Refill: 1  -     montelukast (SINGULAIR) 10 MG tablet; Take 1 tablet by mouth Every Evening.   "Dispense: 90 tablet; Refill: 1  -     loratadine (CLARITIN) 10 MG tablet; Take 1 tablet by mouth Daily.  Dispense: 90 tablet; Refill: 1  -     fluticasone (FLONASE) 50 MCG/ACT nasal spray; 2 sprays into the nostril(s) as directed by provider Daily.  Dispense: 15.8 mL; Refill: 1  -     ondansetron ODT (Zofran ODT) 4 MG disintegrating tablet; Place 1 tablet on the tongue Every 8 (Eight) Hours As Needed for Nausea or Vomiting.  Dispense: 60 tablet; Refill: 1  -     galcanezumab-gnlm (Emgality) 120 MG/ML auto-injector pen; Inject 1 mL under the skin into the appropriate area as directed 1 (One) Time for 1 dose.  Dispense: 1.12 mL; Refill: 11  -     Rimegepant Sulfate (NURTEC) 75 MG tablet dispersible tablet; Take 1 tablet by mouth Daily As Needed (migraine).  Dispense: 8 tablet; Refill: 5       Hyperlipidemia we will obtain lipid panel to monitor recommend decrease fried foods red meat pork products cheese increase fruits vegetables whole grains  Hypothyroidism will obtain TSH to monitor current Synthroid dose 25 mcg daily  Seasonal allergies currently controlled with Singulair Claritin Flonase and immunotherapy follow-up with allergist as recommended  Migraines uncontrolled with Topamax will start Emgality and as needed Nurtec will follow up in 90 days if migraines resolve will taper off Topamax at this time  Asthma stable at this time on Symbicort as needed use albuterol no wheezing  Impaired fasting glucose obtain hemoglobin A1c to monitor recommend decrease carb intake exercise 30 minutes daily  Iron deficiency will obtain iron profile to monitor call with results and recommendations for supplementation  Obesity will refer to nutritionist      Follow Up:   Return in about 3 months (around 7/1/2022).    Crispin Laboy, LEATHA    \"Please note that portions of this note were completed with a voice recognition program.\"    "

## 2022-04-04 RX ORDER — LANOLIN ALCOHOL/MO/W.PET/CERES
325 CREAM (GRAM) TOPICAL
Qty: 90 TABLET | Refills: 1 | Status: SHIPPED | OUTPATIENT
Start: 2022-04-04 | End: 2022-10-05 | Stop reason: SDUPTHER

## 2022-04-04 RX ORDER — MULTIVIT WITH MINERALS/LUTEIN
250 TABLET ORAL DAILY
Qty: 90 TABLET | Refills: 1 | Status: SHIPPED | OUTPATIENT
Start: 2022-04-04

## 2022-06-01 RX ORDER — FLUTICASONE PROPIONATE 50 MCG
SPRAY, SUSPENSION (ML) NASAL
Qty: 16 ML | Refills: 1 | Status: SHIPPED | OUTPATIENT
Start: 2022-06-01

## 2022-07-07 ENCOUNTER — OFFICE VISIT (OUTPATIENT)
Dept: FAMILY MEDICINE CLINIC | Facility: CLINIC | Age: 27
End: 2022-07-07

## 2022-07-07 VITALS
BODY MASS INDEX: 53.6 KG/M2 | HEIGHT: 60 IN | HEART RATE: 88 BPM | WEIGHT: 273 LBS | OXYGEN SATURATION: 97 % | TEMPERATURE: 97.1 F | DIASTOLIC BLOOD PRESSURE: 66 MMHG | SYSTOLIC BLOOD PRESSURE: 99 MMHG

## 2022-07-07 DIAGNOSIS — G43.109 MIGRAINE WITH AURA AND WITHOUT STATUS MIGRAINOSUS, NOT INTRACTABLE: ICD-10-CM

## 2022-07-07 DIAGNOSIS — E66.01 CLASS 3 SEVERE OBESITY DUE TO EXCESS CALORIES WITHOUT SERIOUS COMORBIDITY WITH BODY MASS INDEX (BMI) OF 50.0 TO 59.9 IN ADULT: ICD-10-CM

## 2022-07-07 DIAGNOSIS — J45.20 MILD INTERMITTENT ASTHMA WITHOUT COMPLICATION: ICD-10-CM

## 2022-07-07 DIAGNOSIS — M25.562 ACUTE PAIN OF LEFT KNEE: ICD-10-CM

## 2022-07-07 DIAGNOSIS — K59.00 CONSTIPATION, UNSPECIFIED CONSTIPATION TYPE: ICD-10-CM

## 2022-07-07 DIAGNOSIS — D50.9 IRON DEFICIENCY ANEMIA, UNSPECIFIED IRON DEFICIENCY ANEMIA TYPE: ICD-10-CM

## 2022-07-07 DIAGNOSIS — E78.2 MIXED HYPERLIPIDEMIA: ICD-10-CM

## 2022-07-07 DIAGNOSIS — J30.2 SEASONAL ALLERGIES: ICD-10-CM

## 2022-07-07 DIAGNOSIS — Z11.59 NEED FOR HEPATITIS C SCREENING TEST: ICD-10-CM

## 2022-07-07 DIAGNOSIS — E03.9 ACQUIRED HYPOTHYROIDISM: ICD-10-CM

## 2022-07-07 DIAGNOSIS — R73.01 IMPAIRED FASTING GLUCOSE: ICD-10-CM

## 2022-07-07 PROCEDURE — 99214 OFFICE O/P EST MOD 30 MIN: CPT | Performed by: NURSE PRACTITIONER

## 2022-07-07 RX ORDER — GALCANEZUMAB 120 MG/ML
INJECTION, SOLUTION SUBCUTANEOUS
COMMUNITY
Start: 2022-06-09 | End: 2022-10-10 | Stop reason: SDUPTHER

## 2022-07-07 RX ORDER — MELOXICAM 15 MG/1
15 TABLET ORAL DAILY
Qty: 30 TABLET | Refills: 1 | Status: SHIPPED | OUTPATIENT
Start: 2022-07-07 | End: 2022-09-21

## 2022-07-07 RX ORDER — DOCUSATE SODIUM 100 MG/1
100 CAPSULE, LIQUID FILLED ORAL 2 TIMES DAILY
Qty: 180 CAPSULE | Refills: 1 | Status: SHIPPED | OUTPATIENT
Start: 2022-07-07

## 2022-07-07 NOTE — PROGRESS NOTES
Follow Up Office Visit      Patient Name: Stephania Galvan  : 1995   MRN: [id number]     Chief Complaint:    Chief Complaint   Patient presents with   • Hypothyroidism   • Hyperlipidemia   • Allergic Rhinitis   • Asthma   • Migraine       History of Present Illness: Stephania Galvan is a 26 y.o. female who is here today to follow up for allergic rhinitis, asthma, migraines hypothyroidism and hyperlipidemia.  Review labs     Pap- 2020    Last labs- 2022  lmp- 2022    C/o Patient states Saturday she fell after drinking etoh and since then her left knee hurts and feel wobbly/unstable.    Family allergist and asthma immunotherapy q2 weeks    Pt c/o constipation states she finds herself holding her bowel movements at times         Subjective      Review of Systems:   Review of Systems   Constitutional: Negative for chills and fever.   HENT: Negative for ear pain, sinus pain and sore throat.    Respiratory: Negative for cough.    Cardiovascular: Negative for chest pain.   Gastrointestinal: Positive for constipation. Negative for abdominal pain, diarrhea, nausea and vomiting.   Genitourinary: Negative for dysuria.   Musculoskeletal: Positive for arthralgias and joint swelling. Negative for myalgias.   Allergic/Immunologic: Positive for environmental allergies.   Neurological: Positive for headaches.        Past Medical History:   Past Medical History:   Diagnosis Date   • Allergies    • Migraines 2021       Past Surgical History:   Past Surgical History:   Procedure Laterality Date   • DENTAL PROCEDURE         Family History:   Family History   Problem Relation Age of Onset   • Stroke Other    • Heart disease Other    • Diabetes type II Other    • Breast cancer Other        Social History:   Social History     Socioeconomic History   • Marital status: Single   Tobacco Use   • Smoking status: Never Smoker   • Smokeless tobacco: Never Used   Vaping Use   • Vaping Use: Never used   Substance and Sexual  Activity   • Alcohol use: Yes     Comment: occ   • Drug use: Never       Medications:     Current Outpatient Medications:   •  budesonide-formoterol (SYMBICORT) 160-4.5 MCG/ACT inhaler, Inhale 2 puffs 2 (Two) Times a Day., Disp: , Rfl:   •  Emgality 120 MG/ML auto-injector pen, INJECT 1 ML UNDER THE SKIN INTO THE APPROPRIATE AREA AS DIRECTED 1 (ONE) TIME FOR 1 DOSE. *PA DENIED, Disp: , Rfl:   •  levothyroxine (SYNTHROID, LEVOTHROID) 25 MCG tablet, Take 1 tablet by mouth Every Morning., Disp: 90 tablet, Rfl: 1  •  loratadine (CLARITIN) 10 MG tablet, Take 1 tablet by mouth Daily., Disp: 90 tablet, Rfl: 1  •  montelukast (SINGULAIR) 10 MG tablet, Take 1 tablet by mouth Every Evening., Disp: 90 tablet, Rfl: 1  •  omeprazole (priLOSEC) 20 MG capsule, Take 1 capsule by mouth Daily., Disp: 90 capsule, Rfl: 1  •  ondansetron ODT (Zofran ODT) 4 MG disintegrating tablet, Place 1 tablet on the tongue Every 8 (Eight) Hours As Needed for Nausea or Vomiting., Disp: 60 tablet, Rfl: 1  •  ProAir  (90 Base) MCG/ACT inhaler, Inhale 2 puffs Every 4 (Four) Hours As Needed for Wheezing or Shortness of Air., Disp: 18 g, Rfl: 2  •  Rimegepant Sulfate (NURTEC) 75 MG tablet dispersible tablet, Take 1 tablet by mouth Daily As Needed (migraine)., Disp: 8 tablet, Rfl: 5  •  topiramate (TOPAMAX) 50 MG tablet, Take 1 tablet by mouth 2 (Two) Times a Day., Disp: 180 tablet, Rfl: 1  •  docusate sodium (Colace) 100 MG capsule, Take 1 capsule by mouth 2 (Two) Times a Day., Disp: 180 capsule, Rfl: 1  •  ferrous sulfate 325 (65 FE) MG EC tablet, Take 1 tablet by mouth Daily With Breakfast., Disp: 90 tablet, Rfl: 1  •  fluticasone (FLONASE) 50 MCG/ACT nasal spray, SPRAY 2 SPRAYS INTO THE NOSTRIL AS DIRECTED BY PROVIDER DAILY., Disp: 16 mL, Rfl: 1  •  meloxicam (Mobic) 15 MG tablet, Take 1 tablet by mouth Daily., Disp: 30 tablet, Rfl: 1  •  vitamin C (ASCORBIC ACID) 250 MG tablet, Take 1 tablet by mouth Daily., Disp: 90 tablet, Rfl:  "1    Allergies:   No Known Allergies        Objective     Physical Exam:  Vital Signs:   Vitals:    07/07/22 0725 07/07/22 0746   BP: 99/66    Pulse: 117 88   Temp: 97.1 °F (36.2 °C)    SpO2: 97%    Weight: 124 kg (273 lb)    Height: 152.4 cm (60\")      Body mass index is 53.32 kg/m².     Physical Exam  HENT:      Right Ear: A middle ear effusion is present.      Left Ear: A middle ear effusion is present.      Nose: Nose normal.      Mouth/Throat:      Mouth: Mucous membranes are moist.   Eyes:      Conjunctiva/sclera: Conjunctivae normal.   Cardiovascular:      Rate and Rhythm: Normal rate and regular rhythm.      Heart sounds: Normal heart sounds. No murmur heard.  Pulmonary:      Effort: Pulmonary effort is normal.      Breath sounds: Normal breath sounds.   Abdominal:      General: Bowel sounds are normal.      Palpations: Abdomen is soft.   Musculoskeletal:      Right lower leg: No edema.      Left lower leg: No edema.   Lymphadenopathy:      Cervical: No cervical adenopathy.   Skin:     General: Skin is warm and dry.   Neurological:      Mental Status: She is alert.   Psychiatric:         Mood and Affect: Mood normal.             Assessment / Plan      Assessment/Plan:   Diagnoses and all orders for this visit:    1. Mixed hyperlipidemia  -     Lipid Panel; Future    2. Acquired hypothyroidism  -     TSH; Future    3. Class 3 severe obesity due to excess calories without serious comorbidity with body mass index (BMI) of 50.0 to 59.9 in adult (HCC)    4. Migraine with aura and without status migrainosus, not intractable    5. Mild intermittent asthma without complication    6. Acute pain of left knee  -     XR Knee 3 View Left; Future    7. Iron deficiency anemia, unspecified iron deficiency anemia type  -     CBC Auto Differential; Future  -     Iron Profile; Future  -     Ferritin; Future    8. Impaired fasting glucose  -     Comprehensive Metabolic Panel; Future  -     Hemoglobin A1c; Future    9. " "Constipation, unspecified constipation type    10. Seasonal allergies    11. Need for hepatitis C screening test  -     Hepatitis C Antibody; Future    Other orders  -     docusate sodium (Colace) 100 MG capsule; Take 1 capsule by mouth 2 (Two) Times a Day.  Dispense: 180 capsule; Refill: 1  -     meloxicam (Mobic) 15 MG tablet; Take 1 tablet by mouth Daily.  Dispense: 30 tablet; Refill: 1       Hyperlipidemia recommend decrease fried foods red meat pork products cheese increase fruits vegetables whole grains  Hypothyroidism we will obtain TSH to monitor current Synthroid dose 25 mcg daily denies palpitations  Obesity recommend decrease caloric intake increase exercise and exercise 30 minutes daily  Migraines stable at this time on Emgality Topamax and as needed Nurtec  Asthma stable on Symbicort.  Use albuterol no wheezing or shortness of breath at this time  Acute pain of left knee will obtain x-ray and provid meloxicam for pain take with food ice and elevate we will call with x-ray results and further recommendations  Iron deficiency anemia continue supplementation at this time will monitor on next follow-up appointment in 90 days  Impaired fasting glucose recommend decrease carb intake exercise 30 minutes daily  Seasonal allergies stable at this time Flonase Claritin and Singulair recommend follow-up with allergist to continue immunotherapy  Constipation will add Colace 1 to 2 tablets at nighttime recommend increase water intake increase fiber in diet and regular bathroom breaks      Follow Up:   Return in about 3 months (around 10/7/2022).    Crispin Laboy, LEATHA    \"Please note that portions of this note were completed with a voice recognition program.\"    "

## 2022-08-01 RX ORDER — LEVOTHYROXINE SODIUM 0.03 MG/1
TABLET ORAL
Qty: 90 TABLET | Refills: 1 | Status: SHIPPED | OUTPATIENT
Start: 2022-08-01

## 2022-09-21 RX ORDER — MELOXICAM 15 MG/1
TABLET ORAL
Qty: 30 TABLET | Refills: 1 | Status: SHIPPED | OUTPATIENT
Start: 2022-09-21

## 2022-10-05 RX ORDER — LANOLIN ALCOHOL/MO/W.PET/CERES
CREAM (GRAM) TOPICAL
Qty: 30 TABLET | Refills: 5 | Status: SHIPPED | OUTPATIENT
Start: 2022-10-05

## 2022-10-10 RX ORDER — GALCANEZUMAB 120 MG/ML
120 INJECTION, SOLUTION SUBCUTANEOUS ONCE
Qty: 1.12 ML | Refills: 2 | Status: SHIPPED | OUTPATIENT
Start: 2022-10-10 | End: 2022-10-10

## 2022-10-13 ENCOUNTER — OFFICE VISIT (OUTPATIENT)
Dept: FAMILY MEDICINE CLINIC | Facility: CLINIC | Age: 27
End: 2022-10-13

## 2022-10-13 VITALS
BODY MASS INDEX: 53.01 KG/M2 | HEART RATE: 76 BPM | DIASTOLIC BLOOD PRESSURE: 70 MMHG | HEIGHT: 60 IN | TEMPERATURE: 96.5 F | SYSTOLIC BLOOD PRESSURE: 107 MMHG | WEIGHT: 270 LBS | OXYGEN SATURATION: 99 %

## 2022-10-13 DIAGNOSIS — E78.2 MIXED HYPERLIPIDEMIA: ICD-10-CM

## 2022-10-13 DIAGNOSIS — R53.83 FATIGUE, UNSPECIFIED TYPE: ICD-10-CM

## 2022-10-13 DIAGNOSIS — R06.83 SNORING: ICD-10-CM

## 2022-10-13 DIAGNOSIS — Z11.59 NEED FOR HEPATITIS C SCREENING TEST: ICD-10-CM

## 2022-10-13 DIAGNOSIS — K59.00 CONSTIPATION, UNSPECIFIED CONSTIPATION TYPE: ICD-10-CM

## 2022-10-13 DIAGNOSIS — J30.2 SEASONAL ALLERGIES: ICD-10-CM

## 2022-10-13 DIAGNOSIS — N89.8 VAGINAL DISCHARGE: ICD-10-CM

## 2022-10-13 DIAGNOSIS — Z23 NEED FOR HPV VACCINE: ICD-10-CM

## 2022-10-13 DIAGNOSIS — Z23 NEED FOR TETANUS, DIPHTHERIA, AND ACELLULAR PERTUSSIS (TDAP) VACCINE: ICD-10-CM

## 2022-10-13 DIAGNOSIS — K92.1 BLOOD IN STOOL: ICD-10-CM

## 2022-10-13 DIAGNOSIS — J45.20 MILD INTERMITTENT ASTHMA WITHOUT COMPLICATION: ICD-10-CM

## 2022-10-13 DIAGNOSIS — D50.9 IRON DEFICIENCY ANEMIA, UNSPECIFIED IRON DEFICIENCY ANEMIA TYPE: ICD-10-CM

## 2022-10-13 DIAGNOSIS — Z23 NEED FOR COVID-19 VACCINE: ICD-10-CM

## 2022-10-13 DIAGNOSIS — Z23 NEED FOR INFLUENZA VACCINATION: ICD-10-CM

## 2022-10-13 DIAGNOSIS — Z01.419 ENCOUNTER FOR GYNECOLOGICAL EXAMINATION: Primary | ICD-10-CM

## 2022-10-13 DIAGNOSIS — G43.109 MIGRAINE WITH AURA AND WITHOUT STATUS MIGRAINOSUS, NOT INTRACTABLE: ICD-10-CM

## 2022-10-13 DIAGNOSIS — E03.9 ACQUIRED HYPOTHYROIDISM: ICD-10-CM

## 2022-10-13 DIAGNOSIS — R73.01 IMPAIRED FASTING GLUCOSE: ICD-10-CM

## 2022-10-13 DIAGNOSIS — E66.01 CLASS 3 SEVERE OBESITY DUE TO EXCESS CALORIES WITHOUT SERIOUS COMORBIDITY WITH BODY MASS INDEX (BMI) OF 50.0 TO 59.9 IN ADULT: ICD-10-CM

## 2022-10-13 LAB
ALBUMIN SERPL-MCNC: 4.4 G/DL (ref 3.5–5.2)
ALBUMIN/GLOB SERPL: 1.6 G/DL
ALP SERPL-CCNC: 92 U/L (ref 39–117)
ALT SERPL W P-5'-P-CCNC: 11 U/L (ref 1–33)
ANION GAP SERPL CALCULATED.3IONS-SCNC: 9.9 MMOL/L (ref 5–15)
AST SERPL-CCNC: 12 U/L (ref 1–32)
BASOPHILS # BLD AUTO: 0.05 10*3/MM3 (ref 0–0.2)
BASOPHILS NFR BLD AUTO: 0.7 % (ref 0–1.5)
BILIRUB SERPL-MCNC: 0.7 MG/DL (ref 0–1.2)
BUN SERPL-MCNC: 11 MG/DL (ref 6–20)
BUN/CREAT SERPL: 11.8 (ref 7–25)
CALCIUM SPEC-SCNC: 9.2 MG/DL (ref 8.6–10.5)
CANDIDA SPECIES: NEGATIVE
CHLORIDE SERPL-SCNC: 104 MMOL/L (ref 98–107)
CHOLEST SERPL-MCNC: 182 MG/DL (ref 0–200)
CO2 SERPL-SCNC: 23.1 MMOL/L (ref 22–29)
CREAT SERPL-MCNC: 0.93 MG/DL (ref 0.57–1)
DEPRECATED RDW RBC AUTO: 41 FL (ref 37–54)
EGFRCR SERPLBLD CKD-EPI 2021: 87.1 ML/MIN/1.73
EOSINOPHIL # BLD AUTO: 0.18 10*3/MM3 (ref 0–0.4)
EOSINOPHIL NFR BLD AUTO: 2.5 % (ref 0.3–6.2)
ERYTHROCYTE [DISTWIDTH] IN BLOOD BY AUTOMATED COUNT: 13.1 % (ref 12.3–15.4)
FERRITIN SERPL-MCNC: 98.3 NG/ML (ref 13–150)
GARDNERELLA VAGINALIS: NEGATIVE
GLOBULIN UR ELPH-MCNC: 2.7 GM/DL
GLUCOSE SERPL-MCNC: 98 MG/DL (ref 65–99)
HBA1C MFR BLD: 5.6 % (ref 4.8–5.6)
HCT VFR BLD AUTO: 42.8 % (ref 34–46.6)
HCV AB SER DONR QL: NORMAL
HDLC SERPL-MCNC: 46 MG/DL (ref 40–60)
HGB BLD-MCNC: 13.9 G/DL (ref 12–15.9)
IMM GRANULOCYTES # BLD AUTO: 0.04 10*3/MM3 (ref 0–0.05)
IMM GRANULOCYTES NFR BLD AUTO: 0.6 % (ref 0–0.5)
IRON 24H UR-MRATE: 73 MCG/DL (ref 37–145)
IRON SATN MFR SERPL: 18 % (ref 20–50)
LDLC SERPL CALC-MCNC: 121 MG/DL (ref 0–100)
LDLC/HDLC SERPL: 2.62 {RATIO}
LYMPHOCYTES # BLD AUTO: 1.66 10*3/MM3 (ref 0.7–3.1)
LYMPHOCYTES NFR BLD AUTO: 22.9 % (ref 19.6–45.3)
MCH RBC QN AUTO: 28.2 PG (ref 26.6–33)
MCHC RBC AUTO-ENTMCNC: 32.5 G/DL (ref 31.5–35.7)
MCV RBC AUTO: 86.8 FL (ref 79–97)
MONOCYTES # BLD AUTO: 0.34 10*3/MM3 (ref 0.1–0.9)
MONOCYTES NFR BLD AUTO: 4.7 % (ref 5–12)
NEUTROPHILS NFR BLD AUTO: 4.99 10*3/MM3 (ref 1.7–7)
NEUTROPHILS NFR BLD AUTO: 68.6 % (ref 42.7–76)
NRBC BLD AUTO-RTO: 0 /100 WBC (ref 0–0.2)
PLATELET # BLD AUTO: 292 10*3/MM3 (ref 140–450)
PMV BLD AUTO: 9.3 FL (ref 6–12)
POTASSIUM SERPL-SCNC: 4 MMOL/L (ref 3.5–5.2)
PROT SERPL-MCNC: 7.1 G/DL (ref 6–8.5)
RBC # BLD AUTO: 4.93 10*6/MM3 (ref 3.77–5.28)
SODIUM SERPL-SCNC: 137 MMOL/L (ref 136–145)
T VAGINALIS DNA VAG QL PROBE+SIG AMP: NEGATIVE
TIBC SERPL-MCNC: 398 MCG/DL (ref 298–536)
TRANSFERRIN SERPL-MCNC: 267 MG/DL (ref 200–360)
TRIGL SERPL-MCNC: 78 MG/DL (ref 0–150)
TSH SERPL DL<=0.05 MIU/L-ACNC: 2 UIU/ML (ref 0.27–4.2)
VLDLC SERPL-MCNC: 15 MG/DL (ref 5–40)
WBC NRBC COR # BLD: 7.26 10*3/MM3 (ref 3.4–10.8)

## 2022-10-13 PROCEDURE — 3008F BODY MASS INDEX DOCD: CPT | Performed by: NURSE PRACTITIONER

## 2022-10-13 PROCEDURE — 83036 HEMOGLOBIN GLYCOSYLATED A1C: CPT | Performed by: NURSE PRACTITIONER

## 2022-10-13 PROCEDURE — 91312 COVID-19 (PFIZER) BIVALENT BOOSTER 12+YRS: CPT | Performed by: NURSE PRACTITIONER

## 2022-10-13 PROCEDURE — 90471 IMMUNIZATION ADMIN: CPT | Performed by: NURSE PRACTITIONER

## 2022-10-13 PROCEDURE — 80050 GENERAL HEALTH PANEL: CPT | Performed by: NURSE PRACTITIONER

## 2022-10-13 PROCEDURE — 90651 9VHPV VACCINE 2/3 DOSE IM: CPT | Performed by: NURSE PRACTITIONER

## 2022-10-13 PROCEDURE — 99395 PREV VISIT EST AGE 18-39: CPT | Performed by: NURSE PRACTITIONER

## 2022-10-13 PROCEDURE — 2014F MENTAL STATUS ASSESS: CPT | Performed by: NURSE PRACTITIONER

## 2022-10-13 PROCEDURE — 90472 IMMUNIZATION ADMIN EACH ADD: CPT | Performed by: NURSE PRACTITIONER

## 2022-10-13 PROCEDURE — 90715 TDAP VACCINE 7 YRS/> IM: CPT | Performed by: NURSE PRACTITIONER

## 2022-10-13 PROCEDURE — G0123 SCREEN CERV/VAG THIN LAYER: HCPCS | Performed by: NURSE PRACTITIONER

## 2022-10-13 PROCEDURE — 84466 ASSAY OF TRANSFERRIN: CPT | Performed by: NURSE PRACTITIONER

## 2022-10-13 PROCEDURE — 82728 ASSAY OF FERRITIN: CPT | Performed by: NURSE PRACTITIONER

## 2022-10-13 PROCEDURE — 90686 IIV4 VACC NO PRSV 0.5 ML IM: CPT | Performed by: NURSE PRACTITIONER

## 2022-10-13 PROCEDURE — 87660 TRICHOMONAS VAGIN DIR PROBE: CPT | Performed by: NURSE PRACTITIONER

## 2022-10-13 PROCEDURE — 86803 HEPATITIS C AB TEST: CPT | Performed by: NURSE PRACTITIONER

## 2022-10-13 PROCEDURE — 99214 OFFICE O/P EST MOD 30 MIN: CPT | Performed by: NURSE PRACTITIONER

## 2022-10-13 PROCEDURE — 87480 CANDIDA DNA DIR PROBE: CPT | Performed by: NURSE PRACTITIONER

## 2022-10-13 PROCEDURE — 0124A PR ADM SARSCOV2 30MCG/0.3ML BST: CPT | Performed by: NURSE PRACTITIONER

## 2022-10-13 PROCEDURE — 80061 LIPID PANEL: CPT | Performed by: NURSE PRACTITIONER

## 2022-10-13 PROCEDURE — 87510 GARDNER VAG DNA DIR PROBE: CPT | Performed by: NURSE PRACTITIONER

## 2022-10-13 PROCEDURE — 87624 HPV HI-RISK TYP POOLED RSLT: CPT | Performed by: NURSE PRACTITIONER

## 2022-10-13 PROCEDURE — 83540 ASSAY OF IRON: CPT | Performed by: NURSE PRACTITIONER

## 2022-10-13 RX ORDER — GALCANEZUMAB 120 MG/ML
120 INJECTION, SOLUTION SUBCUTANEOUS ONCE
Qty: 3 ML | Refills: 1 | Status: SHIPPED | OUTPATIENT
Start: 2022-10-13 | End: 2022-10-13

## 2022-10-13 RX ORDER — AZELASTINE HYDROCHLORIDE AND FLUTICASONE PROPIONATE 137; 50 UG/1; UG/1
SPRAY, METERED NASAL
COMMUNITY
Start: 2022-10-13

## 2022-10-13 RX ORDER — TOPIRAMATE 50 MG/1
50 TABLET, FILM COATED ORAL 2 TIMES DAILY
Qty: 180 TABLET | Refills: 1 | Status: SHIPPED | OUTPATIENT
Start: 2022-10-13

## 2022-10-13 NOTE — PROGRESS NOTES
Female Physical / PAP Note      Patient Name: Stephania Galvan  : 1995   MRN: [id number]     Chief Complaint:    Chief Complaint   Patient presents with   • Gynecologic Exam   • Hypothyroidism   • Hyperlipidemia   • Asthma   • Migraine       History of Present Illness: Stephania Galvan is a 26 y.o. female who is here today for her annual health maintenance and physical.     lmp- 10/1/22  Pap- 2020      History of Present Illness: Stephania Galvan is a 26 y.o. female who is here today to follow up for allergic rhinitis, asthma, migraines hypothyroidism and hyperlipidemia.    Labs-2022    C/o Patient has been taking Colace, but has been constipated for 2-3 weeks.   When she has a bowel movement there is blood in the toilet.   Patient has also been really fatigued, this has been going on a few months.    Also c/o fatigue would like sleep study recommended by allergist     Migraines in the past month 1-2 per week  Did not  her emgality this month       Subjective      Review of Systems:   Review of Systems   Constitutional: Positive for fatigue. Negative for fever.   HENT: Negative for ear pain, sinus pain and sore throat.    Respiratory: Negative for cough.    Cardiovascular: Negative for chest pain.   Gastrointestinal: Positive for blood in stool. Negative for abdominal pain, diarrhea, nausea and vomiting.   Genitourinary: Negative for dysuria.   Skin: Negative for rash.   Allergic/Immunologic: Positive for environmental allergies.   Neurological: Positive for headaches.      Breast - No tenderness, lumps, discharge, or blood from nipples.      Past Medical History, Social History, Family History and Care Team were all reviewed with patient and updated as appropriate.     Medications:     Current Outpatient Medications:   •  budesonide-formoterol (SYMBICORT) 160-4.5 MCG/ACT inhaler, Inhale 2 puffs 2 (Two) Times a Day., Disp: , Rfl:   •  docusate sodium (Colace) 100 MG capsule, Take 1 capsule by  "mouth 2 (Two) Times a Day., Disp: 180 capsule, Rfl: 1  •  ferrous sulfate 325 (65 FE) MG EC tablet, TAKE 1 TABLET BY MOUTH EVERY DAY WITH BREAKFAST, Disp: 30 tablet, Rfl: 5  •  fluticasone (FLONASE) 50 MCG/ACT nasal spray, SPRAY 2 SPRAYS INTO THE NOSTRIL AS DIRECTED BY PROVIDER DAILY., Disp: 16 mL, Rfl: 1  •  levothyroxine (SYNTHROID, LEVOTHROID) 25 MCG tablet, TAKE 1 TABLET BY MOUTH EVERY DAY IN THE MORNING, Disp: 90 tablet, Rfl: 1  •  loratadine (CLARITIN) 10 MG tablet, Take 1 tablet by mouth Daily., Disp: 90 tablet, Rfl: 1  •  meloxicam (MOBIC) 15 MG tablet, TAKE 1 TABLET BY MOUTH EVERY DAY, Disp: 30 tablet, Rfl: 1  •  montelukast (SINGULAIR) 10 MG tablet, Take 1 tablet by mouth Every Evening., Disp: 90 tablet, Rfl: 1  •  omeprazole (priLOSEC) 20 MG capsule, Take 1 capsule by mouth Daily., Disp: 90 capsule, Rfl: 1  •  ondansetron ODT (Zofran ODT) 4 MG disintegrating tablet, Place 1 tablet on the tongue Every 8 (Eight) Hours As Needed for Nausea or Vomiting., Disp: 60 tablet, Rfl: 1  •  ProAir  (90 Base) MCG/ACT inhaler, Inhale 2 puffs Every 4 (Four) Hours As Needed for Wheezing or Shortness of Air., Disp: 18 g, Rfl: 2  •  Rimegepant Sulfate (NURTEC) 75 MG tablet dispersible tablet, Take 1 tablet by mouth Daily As Needed (migraine)., Disp: 8 tablet, Rfl: 5  •  topiramate (TOPAMAX) 50 MG tablet, Take 1 tablet by mouth 2 (Two) Times a Day., Disp: 180 tablet, Rfl: 1  •  vitamin C (ASCORBIC ACID) 250 MG tablet, Take 1 tablet by mouth Daily., Disp: 90 tablet, Rfl: 1  •  Dymista 137-50 MCG/ACT suspension, , Disp: , Rfl:   •  linaclotide (Linzess) 145 MCG capsule capsule, Take 1 capsule by mouth Every Morning Before Breakfast., Disp: 30 capsule, Rfl: 5    Allergies:   No Known Allergies      Objective     Physical Exam:  Vital Signs:   Vitals:    10/13/22 0835   BP: 107/70   Pulse: 76   Temp: 96.5 °F (35.8 °C)   SpO2: 99%   Weight: 122 kg (270 lb)   Height: 152.4 cm (60\")     Body mass index is 52.73 kg/m². "     Physical Exam  HENT:      Right Ear: Tympanic membrane normal.      Left Ear: Tympanic membrane normal.      Nose: Nose normal.      Mouth/Throat:      Mouth: Mucous membranes are moist.   Eyes:      Conjunctiva/sclera: Conjunctivae normal.   Cardiovascular:      Rate and Rhythm: Normal rate and regular rhythm.      Heart sounds: Normal heart sounds. No murmur heard.  Pulmonary:      Effort: Pulmonary effort is normal.      Breath sounds: Normal breath sounds.   Abdominal:      General: Bowel sounds are normal.      Palpations: Abdomen is soft.   Genitourinary:     General: Normal vulva.      Vagina: Vaginal discharge present.      Rectum: Normal.   Musculoskeletal:      Right lower leg: No edema.      Left lower leg: No edema.   Lymphadenopathy:      Cervical: No cervical adenopathy.   Neurological:      Mental Status: She is alert.   Psychiatric:         Mood and Affect: Mood normal.         Behavior: Behavior normal.             Assessment / Plan      Assessment/Plan:   Diagnoses and all orders for this visit:    1. Encounter for gynecological examination (Primary)  -     IgP, Aptima HPV    2. Migraine with aura and without status migrainosus, not intractable    3. Acquired hypothyroidism  -     TSH    4. Mixed hyperlipidemia  -     Lipid Panel    5. Mild intermittent asthma without complication    6. Iron deficiency anemia, unspecified iron deficiency anemia type  -     Ambulatory Referral to Gastroenterology  -     Ferritin  -     Iron Profile  -     CBC Auto Differential    7. Constipation, unspecified constipation type    8. Impaired fasting glucose  -     Hemoglobin A1c  -     Comprehensive Metabolic Panel    9. Class 3 severe obesity due to excess calories without serious comorbidity with body mass index (BMI) of 50.0 to 59.9 in adult (HCC)    10. Seasonal allergies    11. Blood in stool  -     Ambulatory Referral to Gastroenterology    12. Fatigue, unspecified type  -     Ambulatory Referral to Sleep  Medicine    13. Vaginal discharge  -     Gardnerella vaginalis, Trichomonas vaginalis, Candida albicans, DNA - Swab, Vagina    14. Snoring  -     Ambulatory Referral to Sleep Medicine    15. Need for hepatitis C screening test  -     Hepatitis C Antibody    16. Need for HPV vaccine  -     HPV Vaccine (HPV9)    17. Need for COVID-19 vaccine  -     COVID-19 Bivalent Booster (Pfizer) 12+yrs    18. Need for influenza vaccination  -     FluLaval/Fluzone >6 mos (4410-9359)    19. Need for tetanus, diphtheria, and acellular pertussis (Tdap) vaccine  -     Tdap Vaccine Greater Than or Equal To 8yo IM    Other orders  -     Rimegepant Sulfate (NURTEC) 75 MG tablet dispersible tablet; Take 1 tablet by mouth Daily As Needed (migraine).  Dispense: 8 tablet; Refill: 5  -     topiramate (TOPAMAX) 50 MG tablet; Take 1 tablet by mouth 2 (Two) Times a Day.  Dispense: 180 tablet; Refill: 1  -     galcanezumab-gnlm (Emgality) 120 MG/ML auto-injector pen; Inject 1 mL under the skin into the appropriate area as directed 1 (One) Time for 1 dose.  Dispense: 3 mL; Refill: 1  -     linaclotide (Linzess) 145 MCG capsule capsule; Take 1 capsule by mouth Every Morning Before Breakfast.  Dispense: 30 capsule; Refill: 5       Encounter for well woman exam Pap smear collected  Migraine controlled when taking Emgality as directed emergent as needed use of Nurtec continue Topamax at this time  Hypothyroidism we will obtain TSH monitor current Synthroid dose 25 mcg daily denies palpitations  Hyperlipidemia obtain lipid panel to monitor recommend decrease fried foods red meat pork products cheese increase fruits vegetables whole grains  Exercise 30 minutes daily  Constipation increase water intake increase fiber intake Colace 2 tablets daily exercise 30 minutes daily  Blood in stool will refer for colonoscopy  Vaginal discharge obtain vaginal screen call with results and further recommendations  Fatigue will refer for sleep study      Follow Up:  "  Return in about 6 months (around 4/13/2023).    Healthcare Maintenance:   Counseling provided on screening Pap smear mammogram diet and exercise  Stephania Galvan voices understanding and acceptance of this advice and will call back with any further questions or concerns. AVS with preventive healthcare tips printed for patient.     Sai Laboy, APRN    \"Please note that portions of this note were completed with a voice recognition program.\"    "

## 2022-10-20 LAB
CYTOLOGIST CVX/VAG CYTO: NORMAL
CYTOLOGY CVX/VAG DOC CYTO: NORMAL
CYTOLOGY CVX/VAG DOC THIN PREP: NORMAL
DX ICD CODE: NORMAL
HIV 1 & 2 AB SER-IMP: NORMAL
HPV I/H RISK 4 DNA CVX QL PROBE+SIG AMP: NEGATIVE
OTHER STN SPEC: NORMAL
STAT OF ADQ CVX/VAG CYTO-IMP: NORMAL

## 2022-10-21 RX ORDER — MONTELUKAST SODIUM 10 MG/1
TABLET ORAL
Qty: 90 TABLET | Refills: 1 | Status: SHIPPED | OUTPATIENT
Start: 2022-10-21

## 2022-10-21 RX ORDER — ONDANSETRON 4 MG/1
4 TABLET, ORALLY DISINTEGRATING ORAL EVERY 8 HOURS PRN
Qty: 60 TABLET | Refills: 1 | Status: SHIPPED | OUTPATIENT
Start: 2022-10-21 | End: 2023-03-02

## 2022-10-25 ENCOUNTER — OFFICE VISIT (OUTPATIENT)
Dept: GASTROENTEROLOGY | Facility: CLINIC | Age: 27
End: 2022-10-25

## 2022-10-25 VITALS
SYSTOLIC BLOOD PRESSURE: 123 MMHG | BODY MASS INDEX: 51.83 KG/M2 | WEIGHT: 264 LBS | DIASTOLIC BLOOD PRESSURE: 76 MMHG | HEART RATE: 59 BPM | HEIGHT: 60 IN

## 2022-10-25 DIAGNOSIS — K59.04 CHRONIC IDIOPATHIC CONSTIPATION: Primary | ICD-10-CM

## 2022-10-25 DIAGNOSIS — K21.9 GASTROESOPHAGEAL REFLUX DISEASE, UNSPECIFIED WHETHER ESOPHAGITIS PRESENT: ICD-10-CM

## 2022-10-25 DIAGNOSIS — K62.5 RECTAL BLEEDING: ICD-10-CM

## 2022-10-25 PROCEDURE — 99214 OFFICE O/P EST MOD 30 MIN: CPT | Performed by: NURSE PRACTITIONER

## 2022-10-25 RX ORDER — GALCANEZUMAB 120 MG/ML
INJECTION, SOLUTION SUBCUTANEOUS
COMMUNITY
Start: 2022-10-13

## 2022-10-25 RX ORDER — POLYETHYLENE GLYCOL 3350, SODIUM SULFATE, SODIUM CHLORIDE, POTASSIUM CHLORIDE, ASCORBIC ACID, SODIUM ASCORBATE 140-9-5.2G
1 KIT ORAL TAKE AS DIRECTED
Qty: 1 EACH | Refills: 0 | Status: SHIPPED | OUTPATIENT
Start: 2022-10-25

## 2022-10-25 NOTE — PROGRESS NOTES
Chief Complaint     Anemia (Iron deficiency)    History of Present Illness     Stephania Galvan is a 27 y.o. female who presents to [facility] on referral from Sai Laboy* for a gastroenterology evaluation of rectal bleeding.      She reports rectal bleeding that's been present for 2-3 weeks.  Reports an irregular bowel pattern and will often alternate between diarrhea and constipation.  Bleeding is worse when constipation is present.  She has taken linzess for three doses.  Previously tried colace.      Admits nausea and abdominal pain when she needs to have a bowel movement.  Nausea is typically present during a bowel movement and requires her to close her eyes to prevent vomiting.      Admits acid reflux that's controlled with omeprazole daily.  Will experience breakthrough reflux if she misses a dose.      Reports that three of her cousins have crohns.       History      Past Medical History:   Diagnosis Date   • Allergies    • Anemia    • GERD (gastroesophageal reflux disease)    • Hyperlipidemia    • Migraines 02/02/2021       Past Surgical History:   Procedure Laterality Date   • DENTAL PROCEDURE         Family History   Problem Relation Age of Onset   • Stomach cancer Maternal Grandmother    • Stroke Other    • Heart disease Other    • Diabetes type II Other    • Breast cancer Other         Current Medications        Current Outpatient Medications:   •  budesonide-formoterol (SYMBICORT) 160-4.5 MCG/ACT inhaler, Inhale 2 puffs 2 (Two) Times a Day., Disp: , Rfl:   •  docusate sodium (Colace) 100 MG capsule, Take 1 capsule by mouth 2 (Two) Times a Day., Disp: 180 capsule, Rfl: 1  •  Dymista 137-50 MCG/ACT suspension, , Disp: , Rfl:   •  Emgality 120 MG/ML auto-injector pen, INJECT 1 ML UNDER THE SKIN INTO THE APPROPRIATE AREA AS DIRECTED 1 (ONE) TIME FOR 1 DOSE., Disp: , Rfl:   •  ferrous sulfate 325 (65 FE) MG EC tablet, TAKE 1 TABLET BY MOUTH EVERY DAY WITH  BREAKFAST, Disp: 30 tablet, Rfl: 5  •  fluticasone (FLONASE) 50 MCG/ACT nasal spray, SPRAY 2 SPRAYS INTO THE NOSTRIL AS DIRECTED BY PROVIDER DAILY., Disp: 16 mL, Rfl: 1  •  levothyroxine (SYNTHROID, LEVOTHROID) 25 MCG tablet, TAKE 1 TABLET BY MOUTH EVERY DAY IN THE MORNING, Disp: 90 tablet, Rfl: 1  •  linaclotide (Linzess) 145 MCG capsule capsule, Take 1 capsule by mouth Every Morning Before Breakfast., Disp: 30 capsule, Rfl: 5  •  loratadine (CLARITIN) 10 MG tablet, Take 1 tablet by mouth Daily., Disp: 90 tablet, Rfl: 1  •  meloxicam (MOBIC) 15 MG tablet, TAKE 1 TABLET BY MOUTH EVERY DAY, Disp: 30 tablet, Rfl: 1  •  montelukast (SINGULAIR) 10 MG tablet, TAKE 1 TABLET BY MOUTH EVERY DAY IN THE EVENING, Disp: 90 tablet, Rfl: 1  •  omeprazole (priLOSEC) 20 MG capsule, Take 1 capsule by mouth Daily., Disp: 90 capsule, Rfl: 1  •  ondansetron ODT (ZOFRAN-ODT) 4 MG disintegrating tablet, PLACE 1 TABLET ON THE TONGUE EVERY 8 (EIGHT) HOURS AS NEEDED FOR NAUSEA OR VOMITING., Disp: 60 tablet, Rfl: 1  •  ProAir  (90 Base) MCG/ACT inhaler, Inhale 2 puffs Every 4 (Four) Hours As Needed for Wheezing or Shortness of Air., Disp: 18 g, Rfl: 2  •  Rimegepant Sulfate (NURTEC) 75 MG tablet dispersible tablet, Take 1 tablet by mouth Daily As Needed (migraine)., Disp: 8 tablet, Rfl: 5  •  topiramate (TOPAMAX) 50 MG tablet, Take 1 tablet by mouth 2 (Two) Times a Day., Disp: 180 tablet, Rfl: 1  •  vitamin C (ASCORBIC ACID) 250 MG tablet, Take 1 tablet by mouth Daily., Disp: 90 tablet, Rfl: 1  •  PEG-KCl-NaCl-NaSulf-Na Asc-C (Plenvu) 140 g reconstituted solution solution, Take 140 g by mouth Take As Directed. Attn: Pharmacist: please see notes for coupon code., Disp: 1 each, Rfl: 0     Allergies     No Known Allergies    Social History       Social History     Social History Narrative   • Not on file       Immunizations     Immunization:  Immunization History   Administered Date(s) Administered   • COVID-19 (MODERNA) 1st, 2nd, 3rd  "Dose Only 03/30/2021, 04/30/2021   • COVID-19 (PFIZER) BIVALENT BOOSTER 12+YRS 10/13/2022   • COVID-19 (PFIZER) PURPLE CAP 12/02/2021   • FluLaval/Fluzone >6mos 10/01/2021, 10/13/2022   • Hpv9 10/13/2022   • Influenza, Unspecified 10/15/2019   • Meningococcal MCV4P (Menactra) 11/30/2006   • Tdap 11/30/2006, 10/13/2022          Objective     Objective     Vital Signs:   /76 (BP Location: Right arm, Patient Position: Sitting, Cuff Size: Large Adult)   Pulse 59   Ht 152.4 cm (60\")   Wt 120 kg (264 lb)   BMI 51.56 kg/m²       Physical Exam    Results      Result Review :   The following data was reviewed by: LEATHA Maldonado on 10/25/2022:    CBC w/diff    CBC w/Diff 4/1/22 10/13/22   WBC 6.72 7.26   RBC 4.94 4.93   Hemoglobin 14.0 13.9   Hematocrit 42.1 42.8   MCV 85.2 86.8   MCH 28.3 28.2   MCHC 33.3 32.5   RDW 12.8 13.1   Platelets 330 292   Neutrophil Rel % 59.4 68.6   Immature Granulocyte Rel % 0.3 0.6 (A)   Lymphocyte Rel % 30.5 22.9   Monocyte Rel % 6.3 4.7 (A)   Eosinophil Rel % 2.8 2.5   Basophil Rel % 0.7 0.7   (A) Abnormal value            CMP    CMP 4/1/22 10/13/22   Glucose 99 98   BUN 9 11   Creatinine 0.84 0.93   Sodium 141 137   Potassium 3.6 4.0   Chloride 106 104   Calcium 9.4 9.2   Albumin 4.10 4.40   Total Bilirubin 0.4 0.7   Alkaline Phosphatase 74 92   AST (SGOT) 12 12   ALT (SGPT) 15 11             Iron Profile   Iron   Date Value Ref Range Status   10/13/2022 73 37 - 145 mcg/dL Final     TIBC   Date Value Ref Range Status   10/13/2022 398 298 - 536 mcg/dL Final     Iron Saturation   Date Value Ref Range Status   10/13/2022 18 (L) 20 - 50 % Final     Transferrin   Date Value Ref Range Status   10/13/2022 267 200 - 360 mg/dL Final          Assessment and Plan        Assessment and Plan    Diagnoses and all orders for this visit:    1. Chronic idiopathic constipation (Primary)  -     Case Request; Standing  -     Case Request    2. Rectal bleeding  -     Case Request; Standing  -  "    Case Request    3. Gastroesophageal reflux disease, unspecified whether esophagitis present  -     Case Request; Standing  -     Case Request    Other orders  -     PEG-KCl-NaCl-NaSulf-Na Asc-C (Plenvu) 140 g reconstituted solution solution; Take 140 g by mouth Take As Directed. Attn: Pharmacist: please see notes for coupon code.  Dispense: 1 each; Refill: 0  -     Follow Anesthesia Guidelines / Protocol; Future  -     Verify NPO; Standing  -     Verify Bowel Prep Was Successful; Standing  -     Give Tap Water Enema If Bowel Prep Insufficient; Standing      Encouraged patient to call office if Linzess (prescribed per PCP) is not effective.     ESOPHAGOGASTRODUODENOSCOPY (N/A), COLONOSCOPY (N/A)  The risk of the endoscopy were discussed in detail. Possible risks/complications, benefits, and alternatives to surgical or invasive procedure have been explained to patient and/or legal guardian; risks include bleeding, infection, and perforation. Patient has been evaluated and can tolerate anesthesia and/or sedation.       Follow Up        Follow Up   Return for F/U after procedure.  Patient was given instructions and counseling regarding her condition or for health maintenance advice. Please see specific information pulled into the AVS if appropriate.

## 2023-01-13 RX ORDER — LORATADINE 10 MG/1
TABLET ORAL
Qty: 90 TABLET | Refills: 1 | Status: SHIPPED | OUTPATIENT
Start: 2023-01-13

## 2023-02-27 ENCOUNTER — TELEPHONE (OUTPATIENT)
Dept: GASTROENTEROLOGY | Facility: CLINIC | Age: 28
End: 2023-02-27
Payer: MEDICAID

## 2023-03-02 RX ORDER — ONDANSETRON 4 MG/1
TABLET, ORALLY DISINTEGRATING ORAL
Qty: 60 TABLET | Refills: 1 | Status: SHIPPED | OUTPATIENT
Start: 2023-03-02

## 2023-03-13 ENCOUNTER — TELEPHONE (OUTPATIENT)
Dept: GASTROENTEROLOGY | Facility: CLINIC | Age: 28
End: 2023-03-13
Payer: MEDICAID

## 2023-03-29 RX ORDER — RIZATRIPTAN BENZOATE 10 MG/1
10 TABLET, ORALLY DISINTEGRATING ORAL ONCE AS NEEDED
COMMUNITY
End: 2023-03-29 | Stop reason: SDUPTHER

## 2023-03-29 RX ORDER — RIZATRIPTAN BENZOATE 10 MG/1
10 TABLET, ORALLY DISINTEGRATING ORAL ONCE AS NEEDED
Qty: 8 TABLET | Refills: 5 | Status: SHIPPED | OUTPATIENT
Start: 2023-03-29

## 2023-04-14 ENCOUNTER — PREP FOR SURGERY (OUTPATIENT)
Dept: OTHER | Facility: HOSPITAL | Age: 28
End: 2023-04-14
Payer: MEDICAID

## 2023-04-24 RX ORDER — LANOLIN ALCOHOL/MO/W.PET/CERES
CREAM (GRAM) TOPICAL
Qty: 30 TABLET | Refills: 0 | Status: SHIPPED | OUTPATIENT
Start: 2023-04-24

## 2023-04-24 RX ORDER — LINACLOTIDE 145 UG/1
CAPSULE, GELATIN COATED ORAL
Qty: 30 CAPSULE | Refills: 0 | Status: SHIPPED | OUTPATIENT
Start: 2023-04-24

## 2023-05-02 RX ORDER — OMEPRAZOLE 20 MG/1
CAPSULE, DELAYED RELEASE ORAL
Qty: 90 CAPSULE | Refills: 1 | Status: SHIPPED | OUTPATIENT
Start: 2023-05-02

## 2023-05-26 RX ORDER — LANOLIN ALCOHOL/MO/W.PET/CERES
CREAM (GRAM) TOPICAL
Qty: 30 TABLET | Refills: 0 | Status: SHIPPED | OUTPATIENT
Start: 2023-05-26

## 2023-05-30 RX ORDER — LINACLOTIDE 145 UG/1
CAPSULE, GELATIN COATED ORAL
Qty: 30 CAPSULE | Refills: 0 | Status: SHIPPED | OUTPATIENT
Start: 2023-05-30

## 2023-08-07 RX ORDER — LORATADINE 10 MG/1
TABLET ORAL
Qty: 30 TABLET | Refills: 5 | Status: SHIPPED | OUTPATIENT
Start: 2023-08-07

## 2023-09-27 RX ORDER — ONDANSETRON 4 MG/1
TABLET, ORALLY DISINTEGRATING ORAL
Qty: 60 TABLET | Refills: 1 | Status: SHIPPED | OUTPATIENT
Start: 2023-09-27

## 2023-10-13 RX ORDER — OMEPRAZOLE 20 MG/1
20 CAPSULE, DELAYED RELEASE ORAL DAILY
Qty: 90 CAPSULE | Refills: 1 | Status: SHIPPED | OUTPATIENT
Start: 2023-10-13

## 2023-10-13 RX ORDER — DOCUSATE SODIUM 100 MG/1
100 CAPSULE, LIQUID FILLED ORAL 2 TIMES DAILY
Qty: 180 CAPSULE | Refills: 1 | Status: SHIPPED | OUTPATIENT
Start: 2023-10-13

## 2023-10-13 RX ORDER — GALCANEZUMAB 120 MG/ML
120 INJECTION, SOLUTION SUBCUTANEOUS ONCE
Qty: 1.12 ML | Refills: 0 | Status: SHIPPED | OUTPATIENT
Start: 2023-10-13 | End: 2023-10-13

## 2023-11-04 DIAGNOSIS — E03.9 ACQUIRED HYPOTHYROIDISM: ICD-10-CM

## 2023-11-04 DIAGNOSIS — G43.109 MIGRAINE WITH AURA AND WITHOUT STATUS MIGRAINOSUS, NOT INTRACTABLE: Primary | ICD-10-CM

## 2023-11-04 DIAGNOSIS — J45.20 MILD INTERMITTENT ASTHMA WITHOUT COMPLICATION: ICD-10-CM

## 2023-11-04 RX ORDER — LORATADINE 10 MG/1
10 TABLET ORAL DAILY
Qty: 30 TABLET | Refills: 5 | Status: CANCELLED | OUTPATIENT
Start: 2023-11-04

## 2023-11-06 RX ORDER — LEVOTHYROXINE SODIUM 0.03 MG/1
25 TABLET ORAL EVERY MORNING
Qty: 14 TABLET | Refills: 0 | Status: SHIPPED | OUTPATIENT
Start: 2023-11-06

## 2023-11-06 RX ORDER — BUDESONIDE AND FORMOTEROL FUMARATE DIHYDRATE 160; 4.5 UG/1; UG/1
2 AEROSOL RESPIRATORY (INHALATION)
Qty: 6 G | Refills: 0 | Status: SHIPPED | OUTPATIENT
Start: 2023-11-06

## 2023-11-06 RX ORDER — TOPIRAMATE 50 MG/1
50 TABLET, FILM COATED ORAL 2 TIMES DAILY
Qty: 28 TABLET | Refills: 0 | Status: SHIPPED | OUTPATIENT
Start: 2023-11-06

## 2023-11-06 NOTE — TELEPHONE ENCOUNTER
Gave 14 day supply per Sai. Patient is scheduled for an appointment on 11/21/2023 and she is aware.

## 2023-11-06 NOTE — TELEPHONE ENCOUNTER
Last OV: 10/25/2022  Next OV: none    Relay: 6 months worth of Loratadine was sent in to [facility] on 08/07/2023. Patient needs to contact [facility] for refills of that.

## 2023-11-07 DIAGNOSIS — J30.2 SEASONAL ALLERGIES: Primary | ICD-10-CM

## 2023-11-07 DIAGNOSIS — J45.20 MILD INTERMITTENT ASTHMA, UNSPECIFIED WHETHER COMPLICATED: ICD-10-CM

## 2023-11-07 RX ORDER — ALBUTEROL SULFATE 90 UG/1
2 AEROSOL, METERED RESPIRATORY (INHALATION) EVERY 4 HOURS PRN
Qty: 1 G | Refills: 0 | Status: SHIPPED | OUTPATIENT
Start: 2023-11-07

## 2023-11-17 RX ORDER — GALCANEZUMAB 120 MG/ML
INJECTION, SOLUTION SUBCUTANEOUS
COMMUNITY
Start: 2023-10-13 | End: 2023-11-21 | Stop reason: SDUPTHER

## 2023-11-21 ENCOUNTER — OFFICE VISIT (OUTPATIENT)
Dept: FAMILY MEDICINE CLINIC | Facility: CLINIC | Age: 28
End: 2023-11-21
Payer: MEDICAID

## 2023-11-21 VITALS
HEIGHT: 60 IN | DIASTOLIC BLOOD PRESSURE: 82 MMHG | HEART RATE: 88 BPM | SYSTOLIC BLOOD PRESSURE: 121 MMHG | TEMPERATURE: 96.5 F | WEIGHT: 243 LBS | OXYGEN SATURATION: 97 % | BODY MASS INDEX: 47.71 KG/M2

## 2023-11-21 DIAGNOSIS — E78.2 MIXED HYPERLIPIDEMIA: Primary | ICD-10-CM

## 2023-11-21 DIAGNOSIS — E66.01 CLASS 3 SEVERE OBESITY DUE TO EXCESS CALORIES WITHOUT SERIOUS COMORBIDITY WITH BODY MASS INDEX (BMI) OF 45.0 TO 49.9 IN ADULT: ICD-10-CM

## 2023-11-21 DIAGNOSIS — J45.20 MILD INTERMITTENT ASTHMA WITHOUT COMPLICATION: ICD-10-CM

## 2023-11-21 DIAGNOSIS — R73.01 IMPAIRED FASTING GLUCOSE: ICD-10-CM

## 2023-11-21 DIAGNOSIS — E03.9 ACQUIRED HYPOTHYROIDISM: ICD-10-CM

## 2023-11-21 DIAGNOSIS — G43.109 MIGRAINE WITH AURA AND WITHOUT STATUS MIGRAINOSUS, NOT INTRACTABLE: ICD-10-CM

## 2023-11-21 DIAGNOSIS — J30.2 SEASONAL ALLERGIES: ICD-10-CM

## 2023-11-21 DIAGNOSIS — D50.9 IRON DEFICIENCY ANEMIA, UNSPECIFIED IRON DEFICIENCY ANEMIA TYPE: ICD-10-CM

## 2023-11-21 DIAGNOSIS — K21.9 GASTROESOPHAGEAL REFLUX DISEASE, UNSPECIFIED WHETHER ESOPHAGITIS PRESENT: ICD-10-CM

## 2023-11-21 DIAGNOSIS — K59.04 CHRONIC IDIOPATHIC CONSTIPATION: ICD-10-CM

## 2023-11-21 LAB
ALBUMIN SERPL-MCNC: 4.8 G/DL (ref 3.5–5.2)
ALBUMIN/GLOB SERPL: 2 G/DL
ALP SERPL-CCNC: 78 U/L (ref 39–117)
ALT SERPL W P-5'-P-CCNC: 20 U/L (ref 1–33)
ANION GAP SERPL CALCULATED.3IONS-SCNC: 12 MMOL/L (ref 5–15)
AST SERPL-CCNC: 18 U/L (ref 1–32)
BACTERIA UR QL AUTO: ABNORMAL /HPF
BASOPHILS # BLD AUTO: 0.06 10*3/MM3 (ref 0–0.2)
BASOPHILS NFR BLD AUTO: 1.1 % (ref 0–1.5)
BILIRUB SERPL-MCNC: 0.3 MG/DL (ref 0–1.2)
BILIRUB UR QL STRIP: NEGATIVE
BUN SERPL-MCNC: 13 MG/DL (ref 6–20)
BUN/CREAT SERPL: 14.4 (ref 7–25)
CALCIUM SPEC-SCNC: 9.7 MG/DL (ref 8.6–10.5)
CHLORIDE SERPL-SCNC: 106 MMOL/L (ref 98–107)
CHOLEST SERPL-MCNC: 214 MG/DL (ref 0–200)
CLARITY UR: ABNORMAL
CO2 SERPL-SCNC: 22 MMOL/L (ref 22–29)
COLOR UR: YELLOW
CREAT SERPL-MCNC: 0.9 MG/DL (ref 0.57–1)
DEPRECATED RDW RBC AUTO: 38.9 FL (ref 37–54)
EGFRCR SERPLBLD CKD-EPI 2021: 89.5 ML/MIN/1.73
EOSINOPHIL # BLD AUTO: 0.13 10*3/MM3 (ref 0–0.4)
EOSINOPHIL NFR BLD AUTO: 2.4 % (ref 0.3–6.2)
ERYTHROCYTE [DISTWIDTH] IN BLOOD BY AUTOMATED COUNT: 12.1 % (ref 12.3–15.4)
FERRITIN SERPL-MCNC: 80.6 NG/ML (ref 13–150)
GLOBULIN UR ELPH-MCNC: 2.4 GM/DL
GLUCOSE SERPL-MCNC: 91 MG/DL (ref 65–99)
GLUCOSE UR STRIP-MCNC: NEGATIVE MG/DL
HBA1C MFR BLD: 5.1 % (ref 4.8–5.6)
HCT VFR BLD AUTO: 44.6 % (ref 34–46.6)
HDLC SERPL-MCNC: 47 MG/DL (ref 40–60)
HGB BLD-MCNC: 14.8 G/DL (ref 12–15.9)
HGB UR QL STRIP.AUTO: ABNORMAL
HOLD SPECIMEN: NORMAL
HYALINE CASTS UR QL AUTO: ABNORMAL /LPF
IMM GRANULOCYTES # BLD AUTO: 0.02 10*3/MM3 (ref 0–0.05)
IMM GRANULOCYTES NFR BLD AUTO: 0.4 % (ref 0–0.5)
IRON 24H UR-MRATE: 55 MCG/DL (ref 37–145)
IRON SATN MFR SERPL: 15 % (ref 20–50)
KETONES UR QL STRIP: NEGATIVE
LDLC SERPL CALC-MCNC: 153 MG/DL (ref 0–100)
LDLC/HDLC SERPL: 3.22 {RATIO}
LEUKOCYTE ESTERASE UR QL STRIP.AUTO: ABNORMAL
LYMPHOCYTES # BLD AUTO: 1.7 10*3/MM3 (ref 0.7–3.1)
LYMPHOCYTES NFR BLD AUTO: 31 % (ref 19.6–45.3)
MCH RBC QN AUTO: 28.9 PG (ref 26.6–33)
MCHC RBC AUTO-ENTMCNC: 33.2 G/DL (ref 31.5–35.7)
MCV RBC AUTO: 87.1 FL (ref 79–97)
MONOCYTES # BLD AUTO: 0.28 10*3/MM3 (ref 0.1–0.9)
MONOCYTES NFR BLD AUTO: 5.1 % (ref 5–12)
NEUTROPHILS NFR BLD AUTO: 3.3 10*3/MM3 (ref 1.7–7)
NEUTROPHILS NFR BLD AUTO: 60 % (ref 42.7–76)
NITRITE UR QL STRIP: NEGATIVE
NRBC BLD AUTO-RTO: 0 /100 WBC (ref 0–0.2)
PH UR STRIP.AUTO: 6 [PH] (ref 5–8)
PLATELET # BLD AUTO: 326 10*3/MM3 (ref 140–450)
PMV BLD AUTO: 9.6 FL (ref 6–12)
POTASSIUM SERPL-SCNC: 3.8 MMOL/L (ref 3.5–5.2)
PROT SERPL-MCNC: 7.2 G/DL (ref 6–8.5)
PROT UR QL STRIP: ABNORMAL
RBC # BLD AUTO: 5.12 10*6/MM3 (ref 3.77–5.28)
RBC # UR STRIP: ABNORMAL /HPF
REF LAB TEST METHOD: ABNORMAL
SODIUM SERPL-SCNC: 140 MMOL/L (ref 136–145)
SP GR UR STRIP: 1.03 (ref 1–1.03)
SQUAMOUS #/AREA URNS HPF: ABNORMAL /HPF
TIBC SERPL-MCNC: 374 MCG/DL (ref 298–536)
TRANSFERRIN SERPL-MCNC: 251 MG/DL (ref 200–360)
TRIGL SERPL-MCNC: 78 MG/DL (ref 0–150)
TSH SERPL DL<=0.05 MIU/L-ACNC: 1.33 UIU/ML (ref 0.27–4.2)
UROBILINOGEN UR QL STRIP: ABNORMAL
VLDLC SERPL-MCNC: 14 MG/DL (ref 5–40)
WBC # UR STRIP: ABNORMAL /HPF
WBC NRBC COR # BLD AUTO: 5.49 10*3/MM3 (ref 3.4–10.8)

## 2023-11-21 PROCEDURE — 82728 ASSAY OF FERRITIN: CPT | Performed by: NURSE PRACTITIONER

## 2023-11-21 PROCEDURE — 81001 URINALYSIS AUTO W/SCOPE: CPT | Performed by: NURSE PRACTITIONER

## 2023-11-21 PROCEDURE — 83036 HEMOGLOBIN GLYCOSYLATED A1C: CPT | Performed by: NURSE PRACTITIONER

## 2023-11-21 PROCEDURE — 80061 LIPID PANEL: CPT | Performed by: NURSE PRACTITIONER

## 2023-11-21 PROCEDURE — 80050 GENERAL HEALTH PANEL: CPT | Performed by: NURSE PRACTITIONER

## 2023-11-21 PROCEDURE — 84466 ASSAY OF TRANSFERRIN: CPT | Performed by: NURSE PRACTITIONER

## 2023-11-21 PROCEDURE — 83540 ASSAY OF IRON: CPT | Performed by: NURSE PRACTITIONER

## 2023-11-21 RX ORDER — ALBUTEROL SULFATE 90 UG/1
2 AEROSOL, METERED RESPIRATORY (INHALATION) EVERY 4 HOURS PRN
Qty: 1 G | Refills: 0 | Status: SHIPPED | OUTPATIENT
Start: 2023-11-21

## 2023-11-21 RX ORDER — DOCUSATE SODIUM 100 MG/1
100 CAPSULE, LIQUID FILLED ORAL 2 TIMES DAILY
Qty: 180 CAPSULE | Refills: 1 | Status: SHIPPED | OUTPATIENT
Start: 2023-11-21

## 2023-11-21 RX ORDER — NAPROXEN 500 MG/1
500 TABLET ORAL 2 TIMES DAILY WITH MEALS
Qty: 60 TABLET | Refills: 2 | Status: SHIPPED | OUTPATIENT
Start: 2023-11-21

## 2023-11-21 RX ORDER — MELOXICAM 15 MG/1
15 TABLET ORAL DAILY
Qty: 30 TABLET | Refills: 1 | Status: CANCELLED | OUTPATIENT
Start: 2023-11-21

## 2023-11-21 RX ORDER — OMEPRAZOLE 20 MG/1
20 CAPSULE, DELAYED RELEASE ORAL DAILY
Qty: 90 CAPSULE | Refills: 1 | Status: SHIPPED | OUTPATIENT
Start: 2023-11-21

## 2023-11-21 RX ORDER — FLUTICASONE PROPIONATE 50 MCG
2 SPRAY, SUSPENSION (ML) NASAL DAILY
Qty: 16 ML | Refills: 1 | Status: CANCELLED | OUTPATIENT
Start: 2023-11-21

## 2023-11-21 RX ORDER — MONTELUKAST SODIUM 10 MG/1
10 TABLET ORAL EVERY EVENING
Qty: 90 TABLET | Refills: 1 | Status: SHIPPED | OUTPATIENT
Start: 2023-11-21

## 2023-11-21 RX ORDER — SUMATRIPTAN 100 MG/1
TABLET, FILM COATED ORAL
Qty: 8 TABLET | Refills: 5 | Status: SHIPPED | OUTPATIENT
Start: 2023-11-21

## 2023-11-21 RX ORDER — LANOLIN ALCOHOL/MO/W.PET/CERES
1 CREAM (GRAM) TOPICAL
Qty: 30 TABLET | Refills: 0 | Status: CANCELLED | OUTPATIENT
Start: 2023-11-21

## 2023-11-21 RX ORDER — GALCANEZUMAB 120 MG/ML
120 INJECTION, SOLUTION SUBCUTANEOUS ONCE
Qty: 1.12 ML | Refills: 11 | Status: SHIPPED | OUTPATIENT
Start: 2023-11-21 | End: 2023-11-21

## 2023-11-21 RX ORDER — LORATADINE 10 MG/1
10 TABLET ORAL DAILY
Qty: 90 TABLET | Refills: 1 | Status: SHIPPED | OUTPATIENT
Start: 2023-11-21

## 2023-11-21 RX ORDER — DICYCLOMINE HYDROCHLORIDE 10 MG/1
10 CAPSULE ORAL
Qty: 120 CAPSULE | Refills: 5 | Status: SHIPPED | OUTPATIENT
Start: 2023-11-21

## 2023-11-21 RX ORDER — MULTIVIT WITH MINERALS/LUTEIN
250 TABLET ORAL DAILY
Qty: 90 TABLET | Refills: 1 | Status: CANCELLED | OUTPATIENT
Start: 2023-11-21

## 2023-11-21 RX ORDER — TOPIRAMATE 50 MG/1
50 TABLET, FILM COATED ORAL 2 TIMES DAILY
Qty: 180 TABLET | Refills: 1 | Status: SHIPPED | OUTPATIENT
Start: 2023-11-21

## 2023-11-21 RX ORDER — LEVOTHYROXINE SODIUM 0.03 MG/1
25 TABLET ORAL EVERY MORNING
Qty: 90 TABLET | Refills: 1 | Status: SHIPPED | OUTPATIENT
Start: 2023-11-21

## 2023-11-21 RX ORDER — BUDESONIDE AND FORMOTEROL FUMARATE DIHYDRATE 160; 4.5 UG/1; UG/1
2 AEROSOL RESPIRATORY (INHALATION)
Qty: 6 G | Refills: 5 | Status: SHIPPED | OUTPATIENT
Start: 2023-11-21

## 2023-11-21 NOTE — PROGRESS NOTES
Follow Up Office Visit      Patient Name: Stephania Galvan  : 1995   MRN: [id number]     Chief Complaint:    Chief Complaint   Patient presents with    Annual Exam    Migraine    Hyperlipidemia    Hypothyroidism    Anemia    Heartburn    impaired fasting glucose       History of Present Illness: Stephania Galvan is a 28 y.o. female who is here today to follow up for annual exam, migraine, hyperlipidemia, hypothyroidism, anemia, GERD, IFG.    Labs-10/2022  Pap-10/2022  Lmp- 23    C/o She says her migraines are getting worse. She says her medication is cutting down on the pain, but not like it was.  Currently on emgality and topamax, states she stopped maxalt not effective   Migraine once weekly lasting 1-10 days increased from 1-2 per month       Immunotherapy 1x per week, family allergy and asthma   Taking all medications as directed     Loss of 21 lbs in the past 11 months     Patient complain of intermittent abdominal cramping states she stays constipated most times is using Linzess daily    Subjective      Review of Systems:   Review of Systems   Constitutional:  Negative for fever.   HENT:  Negative for ear pain and sore throat.    Eyes:  Negative for visual disturbance.   Respiratory:  Negative for cough.    Cardiovascular:  Negative for chest pain.   Gastrointestinal:  Positive for constipation and diarrhea. Negative for abdominal pain, nausea and vomiting.   Genitourinary:  Negative for dysuria.   Allergic/Immunologic: Positive for environmental allergies.   Neurological:  Positive for headaches.        Past Medical History:   Past Medical History:   Diagnosis Date    Allergies     Anemia     GERD (gastroesophageal reflux disease)     Hyperlipidemia     Migraines 2021       Past Surgical History:   Past Surgical History:   Procedure Laterality Date    DENTAL PROCEDURE         Family History:   Family History   Problem Relation Age of Onset    Stomach cancer Maternal Grandmother      Stroke Other     Heart disease Other     Diabetes type II Other     Breast cancer Other        Social History:   Social History     Socioeconomic History    Marital status: Single   Tobacco Use    Smoking status: Never     Passive exposure: Never    Smokeless tobacco: Never   Vaping Use    Vaping Use: Never used   Substance and Sexual Activity    Alcohol use: Yes     Comment: occ    Drug use: Never    Sexual activity: Defer       Medications:     Current Outpatient Medications:     albuterol sulfate HFA (Ventolin HFA) 108 (90 Base) MCG/ACT inhaler, Inhale 2 puffs Every 4 (Four) Hours As Needed for Wheezing., Disp: 1 g, Rfl: 0    budesonide-formoterol (SYMBICORT) 160-4.5 MCG/ACT inhaler, Inhale 2 puffs 2 (Two) Times a Day., Disp: 6 g, Rfl: 5    docusate sodium (Colace) 100 MG capsule, Take 1 capsule by mouth 2 (Two) Times a Day., Disp: 180 capsule, Rfl: 1    Dymista 137-50 MCG/ACT suspension, , Disp: , Rfl:     Emgality 120 MG/ML auto-injector pen, Inject 1 mL under the skin into the appropriate area as directed 1 (One) Time for 1 dose., Disp: 1.12 mL, Rfl: 11    levothyroxine (SYNTHROID, LEVOTHROID) 25 MCG tablet, Take 1 tablet by mouth Every Morning., Disp: 90 tablet, Rfl: 1    linaclotide (Linzess) 145 MCG capsule capsule, Take 1 capsule by mouth Every Morning Before Breakfast., Disp: 90 capsule, Rfl: 1    loratadine (CLARITIN) 10 MG tablet, Take 1 tablet by mouth Daily., Disp: 90 tablet, Rfl: 1    montelukast (SINGULAIR) 10 MG tablet, Take 1 tablet by mouth Every Evening., Disp: 90 tablet, Rfl: 1    omeprazole (priLOSEC) 20 MG capsule, Take 1 capsule by mouth Daily., Disp: 90 capsule, Rfl: 1    ondansetron ODT (ZOFRAN-ODT) 4 MG disintegrating tablet, PLACE 1 TABLET ON THE TONGUE EVERY 8 HOURS AS NEEDED FOR NAUSEA AND VOMITING, Disp: 60 tablet, Rfl: 1    PEG-KCl-NaCl-NaSulf-Na Asc-C (Plenvu) 140 g reconstituted solution solution, Take 140 g by mouth Take As Directed. Attn: Pharmacist: please see notes for coupon  "code., Disp: 1 each, Rfl: 0    ProAir  (90 Base) MCG/ACT inhaler, Inhale 2 puffs Every 4 (Four) Hours As Needed for Wheezing or Shortness of Air., Disp: 6.7 g, Rfl: 0    topiramate (TOPAMAX) 50 MG tablet, Take 1 tablet by mouth 2 (Two) Times a Day., Disp: 180 tablet, Rfl: 1    dicyclomine (BENTYL) 10 MG capsule, Take 1 capsule by mouth 4 (Four) Times a Day Before Meals & at Bedtime., Disp: 120 capsule, Rfl: 5    naproxen (Naprosyn) 500 MG tablet, Take 1 tablet by mouth 2 (Two) Times a Day With Meals., Disp: 60 tablet, Rfl: 2    SUMAtriptan (Imitrex) 100 MG tablet, Take one tablet at onset of headache. May repeat dose one time in 2 hours if headache not relieved., Disp: 8 tablet, Rfl: 5    Allergies:   No Known Allergies        PHQ-2 Total Score: 0   PHQ-9 Total Score: 0     Objective     Physical Exam:  Vital Signs:   Vitals:    11/21/23 1041   BP: 121/82   Pulse: 88   Temp: 96.5 °F (35.8 °C)   SpO2: 97%   Weight: 110 kg (243 lb)   Height: 152.4 cm (60\")     Body mass index is 47.46 kg/m².   Class 3 Severe Obesity (BMI >=40). Obesity-related health conditions include the following: dyslipidemias. Obesity is improving with lifestyle modifications. BMI is is above average; BMI management plan is completed. We discussed portion control and increasing exercise.       Physical Exam  HENT:      Left Ear: Tympanic membrane normal.      Nose: Nose normal.      Mouth/Throat:      Mouth: Mucous membranes are moist.   Eyes:      Conjunctiva/sclera: Conjunctivae normal.   Neck:      Vascular: No carotid bruit.   Cardiovascular:      Rate and Rhythm: Normal rate and regular rhythm.      Heart sounds: Normal heart sounds. No murmur heard.  Pulmonary:      Effort: Pulmonary effort is normal.      Breath sounds: Normal breath sounds.   Abdominal:      General: Bowel sounds are normal.      Palpations: Abdomen is soft.   Musculoskeletal:      Right lower leg: No edema.      Left lower leg: No edema.   Skin:     General: Skin " is warm and dry.   Neurological:      Mental Status: She is alert and oriented to person, place, and time.      Cranial Nerves: No cranial nerve deficit.   Psychiatric:         Mood and Affect: Mood normal.         Behavior: Behavior normal.             Assessment / Plan      Assessment/Plan:   Diagnoses and all orders for this visit:    1. Mixed hyperlipidemia (Primary)  -     Lipid Panel    2. Acquired hypothyroidism  -     TSH  -     levothyroxine (SYNTHROID, LEVOTHROID) 25 MCG tablet; Take 1 tablet by mouth Every Morning.  Dispense: 90 tablet; Refill: 1    3. Impaired fasting glucose  -     Comprehensive Metabolic Panel  -     Hemoglobin A1c  -     Urinalysis With Culture If Indicated - Urine, Clean Catch  -     Dana Urine Culture Tube - Urine, Clean Catch    4. Gastroesophageal reflux disease, unspecified whether esophagitis present    5. Chronic idiopathic constipation    6. Iron deficiency anemia, unspecified iron deficiency anemia type  -     CBC Auto Differential  -     Iron Profile  -     Ferritin    7. Seasonal allergies  -     albuterol sulfate HFA (Ventolin HFA) 108 (90 Base) MCG/ACT inhaler; Inhale 2 puffs Every 4 (Four) Hours As Needed for Wheezing.  Dispense: 1 g; Refill: 0    8. Mild intermittent asthma without complication  -     budesonide-formoterol (SYMBICORT) 160-4.5 MCG/ACT inhaler; Inhale 2 puffs 2 (Two) Times a Day.  Dispense: 6 g; Refill: 5  -     ProAir  (90 Base) MCG/ACT inhaler; Inhale 2 puffs Every 4 (Four) Hours As Needed for Wheezing or Shortness of Air.  Dispense: 6.7 g; Refill: 0    9. Migraine with aura and without status migrainosus, not intractable  -     topiramate (TOPAMAX) 50 MG tablet; Take 1 tablet by mouth 2 (Two) Times a Day.  Dispense: 180 tablet; Refill: 1    10. Class 3 severe obesity due to excess calories without serious comorbidity with body mass index (BMI) of 45.0 to 49.9 in adult    Other orders  -     docusate sodium (Colace) 100 MG capsule; Take 1  capsule by mouth 2 (Two) Times a Day.  Dispense: 180 capsule; Refill: 1  -     linaclotide (Linzess) 145 MCG capsule capsule; Take 1 capsule by mouth Every Morning Before Breakfast.  Dispense: 90 capsule; Refill: 1  -     montelukast (SINGULAIR) 10 MG tablet; Take 1 tablet by mouth Every Evening.  Dispense: 90 tablet; Refill: 1  -     omeprazole (priLOSEC) 20 MG capsule; Take 1 capsule by mouth Daily.  Dispense: 90 capsule; Refill: 1  -     Emgality 120 MG/ML auto-injector pen; Inject 1 mL under the skin into the appropriate area as directed 1 (One) Time for 1 dose.  Dispense: 1.12 mL; Refill: 11  -     loratadine (CLARITIN) 10 MG tablet; Take 1 tablet by mouth Daily.  Dispense: 90 tablet; Refill: 1  -     SUMAtriptan (Imitrex) 100 MG tablet; Take one tablet at onset of headache. May repeat dose one time in 2 hours if headache not relieved.  Dispense: 8 tablet; Refill: 5  -     naproxen (Naprosyn) 500 MG tablet; Take 1 tablet by mouth 2 (Two) Times a Day With Meals.  Dispense: 60 tablet; Refill: 2  -     dicyclomine (BENTYL) 10 MG capsule; Take 1 capsule by mouth 4 (Four) Times a Day Before Meals & at Bedtime.  Dispense: 120 capsule; Refill: 5       Hyperlipidemia will obtain lipid  recommend reduce fried foods red meat propriety cheese increase fruits vegetables whole grains exercise 30 minutes daily weight loss  Hypothyroidism we will obtain labs to monitor current Synthroid dose 25 mcg daily denies palpitations  Impaired fasting glucose recommend reduce added carbs sugars exercise 30 minutes daily and weight loss  Reflux currently controlled with omeprazole 20 mg daily provide a refill  Chronic constipation currently controlled Linzess 150 mcg daily we will start Bentyl for cramping  Iron deficiency anemia we will obtain labs to monitor call with results and further recommendations  Seasonal allergies currently controlled with Singulair Claritin Flonase immunotherapy  Mild intermittent asthma  "currently controlled with Symbicort daily rinse mouth out after albuterol as needed no wheezing or shortness of breath at this time  Migraines uncontrolled with abortive therapy Maxalt we will try Imitrex currently on preventive therapy of Emgality and Topamax we will follow-up in 1 month with a migraine diary if greater than 14 migraine days per month we will change abortive therapy to Imitrex and possibly refer to neurology to discuss Botox  Class III obesity with serious comorbidity of hyperlipidemia impaired fasting glucose recommend reduce overall caloric intake exercise 30 minutes daily patient's insurance does not cover Saxenda Contrave or Wegovy        Follow Up:   Return in about 1 month (around 12/21/2023).    Crispin Laboy, APRN    \"Please note that portions of this note were completed with a voice recognition program.\"    "

## 2023-12-21 PROCEDURE — 87081 CULTURE SCREEN ONLY: CPT

## 2023-12-23 ENCOUNTER — TELEPHONE (OUTPATIENT)
Dept: URGENT CARE | Facility: CLINIC | Age: 28
End: 2023-12-23
Payer: MEDICAID

## 2023-12-23 NOTE — TELEPHONE ENCOUNTER
----- Message from LEATHA Gleason sent at 12/23/2023 10:07 AM EST -----  Please notify patient of negative beta strep test result.

## 2023-12-26 ENCOUNTER — OFFICE VISIT (OUTPATIENT)
Dept: FAMILY MEDICINE CLINIC | Facility: CLINIC | Age: 28
End: 2023-12-26
Payer: MEDICAID

## 2023-12-26 VITALS
OXYGEN SATURATION: 99 % | WEIGHT: 258 LBS | TEMPERATURE: 96.8 F | SYSTOLIC BLOOD PRESSURE: 128 MMHG | BODY MASS INDEX: 42.99 KG/M2 | HEART RATE: 87 BPM | HEIGHT: 65 IN | DIASTOLIC BLOOD PRESSURE: 73 MMHG

## 2023-12-26 DIAGNOSIS — R09.89 CHEST CONGESTION: ICD-10-CM

## 2023-12-26 DIAGNOSIS — H65.02 NON-RECURRENT ACUTE SEROUS OTITIS MEDIA OF LEFT EAR: ICD-10-CM

## 2023-12-26 DIAGNOSIS — G43.109 MIGRAINE WITH AURA AND WITHOUT STATUS MIGRAINOSUS, NOT INTRACTABLE: Primary | ICD-10-CM

## 2023-12-26 DIAGNOSIS — J30.2 SEASONAL ALLERGIES: ICD-10-CM

## 2023-12-26 DIAGNOSIS — J45.21 MILD INTERMITTENT ASTHMA WITH ACUTE EXACERBATION: ICD-10-CM

## 2023-12-26 DIAGNOSIS — R05.1 ACUTE COUGH: ICD-10-CM

## 2023-12-26 RX ORDER — AMOXICILLIN AND CLAVULANATE POTASSIUM 875; 125 MG/1; MG/1
1 TABLET, FILM COATED ORAL 2 TIMES DAILY
Qty: 20 TABLET | Refills: 0 | Status: SHIPPED | OUTPATIENT
Start: 2023-12-26 | End: 2024-01-05

## 2023-12-26 RX ORDER — BENZONATATE 200 MG/1
200 CAPSULE ORAL 3 TIMES DAILY PRN
Qty: 90 CAPSULE | Refills: 1 | Status: SHIPPED | OUTPATIENT
Start: 2023-12-26

## 2023-12-26 RX ORDER — METHYLPREDNISOLONE ACETATE 80 MG/ML
80 INJECTION, SUSPENSION INTRA-ARTICULAR; INTRALESIONAL; INTRAMUSCULAR; SOFT TISSUE ONCE
Status: COMPLETED | OUTPATIENT
Start: 2023-12-26 | End: 2023-12-26

## 2023-12-26 RX ORDER — GALCANEZUMAB 120 MG/ML
INJECTION, SOLUTION SUBCUTANEOUS
COMMUNITY
Start: 2023-11-21

## 2023-12-26 RX ORDER — DEXTROMETHORPHAN HYDROBROMIDE AND PROMETHAZINE HYDROCHLORIDE 15; 6.25 MG/5ML; MG/5ML
5 SYRUP ORAL 4 TIMES DAILY PRN
Qty: 473 ML | Refills: 0 | Status: SHIPPED | OUTPATIENT
Start: 2023-12-26

## 2023-12-26 RX ADMIN — METHYLPREDNISOLONE ACETATE 80 MG: 80 INJECTION, SUSPENSION INTRA-ARTICULAR; INTRALESIONAL; INTRAMUSCULAR; SOFT TISSUE at 08:40

## 2023-12-26 NOTE — PROGRESS NOTES
Follow Up Office Visit      Patient Name: Stephania Galvan  : 1995   MRN: [id number]     Chief Complaint:    Chief Complaint   Patient presents with    Migraine    Cough    Asthma    Sore Throat       History of Present Illness: Stephania Galvan is a 28 y.o. female who is here today to follow up for migraines.      Follow up new start imitrex, continued emgality, topamax   Failed maxalt for abortive therapy   She says her migraines have not improved.  She missed her Emgality dose this month.  5 migraines in the past month lasting 2-7 days     Labs-2023  Pap-10/2022    Follow up urgent care 23  presents to the clinic with complaints of sore throat x 1 week.  She states that she commonly gets a sore throat with her asthma due to coughing.  She also tells me that she routinely smokes marijuana and attributes her cough possibly to this as well.  She has not had to use her albuterol inhaler recently.     Covid, flu, strep negative     Pt c/o continues cough, sinus drainage, sore throat and shortness of breath using her rescue inhaler more states she is compliant with her Symbicort inhaler      Subjective      Review of Systems:   Review of Systems   Constitutional:  Negative for fever.   HENT:  Positive for ear pain, postnasal drip, rhinorrhea, sinus pressure, sinus pain and sore throat. Negative for congestion.    Eyes:  Negative for discharge.   Respiratory:  Positive for cough and shortness of breath.    Cardiovascular:  Negative for chest pain.   Allergic/Immunologic: Positive for environmental allergies.   Neurological:  Negative for headaches.        Past Medical History:   Past Medical History:   Diagnosis Date    Allergies     Anemia     Asthma     GERD (gastroesophageal reflux disease)     Hyperlipidemia     Migraines 2021       Past Surgical History:   Past Surgical History:   Procedure Laterality Date    DENTAL PROCEDURE         Family History:   Family History   Problem Relation Age  of Onset    Stomach cancer Maternal Grandmother     Stroke Other     Heart disease Other     Diabetes type II Other     Breast cancer Other        Social History:   Social History     Socioeconomic History    Marital status: Single   Tobacco Use    Smoking status: Never     Passive exposure: Never    Smokeless tobacco: Never   Vaping Use    Vaping Use: Never used   Substance and Sexual Activity    Alcohol use: Yes     Comment: occ    Drug use: Yes     Types: Marijuana    Sexual activity: Defer       Medications:     Current Outpatient Medications:     albuterol sulfate HFA (Ventolin HFA) 108 (90 Base) MCG/ACT inhaler, Inhale 2 puffs Every 4 (Four) Hours As Needed for Wheezing., Disp: 1 g, Rfl: 0    budesonide-formoterol (SYMBICORT) 160-4.5 MCG/ACT inhaler, Inhale 2 puffs 2 (Two) Times a Day., Disp: 6 g, Rfl: 5    dicyclomine (BENTYL) 10 MG capsule, Take 1 capsule by mouth 4 (Four) Times a Day Before Meals & at Bedtime., Disp: 120 capsule, Rfl: 5    docusate sodium (Colace) 100 MG capsule, Take 1 capsule by mouth 2 (Two) Times a Day., Disp: 180 capsule, Rfl: 1    Dymista 137-50 MCG/ACT suspension, , Disp: , Rfl:     Emgality 120 MG/ML auto-injector pen, INJECT 1 ML UNDER THE SKIN INTO THE APPROPRIATE AREA AS DIRECTED 1 (ONE) TIME FOR 1 DOSE., Disp: , Rfl:     levothyroxine (SYNTHROID, LEVOTHROID) 25 MCG tablet, Take 1 tablet by mouth Every Morning., Disp: 90 tablet, Rfl: 1    linaclotide (Linzess) 145 MCG capsule capsule, Take 1 capsule by mouth Every Morning Before Breakfast., Disp: 90 capsule, Rfl: 1    loratadine (CLARITIN) 10 MG tablet, Take 1 tablet by mouth Daily., Disp: 90 tablet, Rfl: 1    montelukast (SINGULAIR) 10 MG tablet, Take 1 tablet by mouth Every Evening., Disp: 90 tablet, Rfl: 1    naproxen (Naprosyn) 500 MG tablet, Take 1 tablet by mouth 2 (Two) Times a Day With Meals., Disp: 60 tablet, Rfl: 2    omeprazole (priLOSEC) 20 MG capsule, Take 1 capsule by mouth Daily., Disp: 90 capsule, Rfl: 1     "ondansetron ODT (ZOFRAN-ODT) 4 MG disintegrating tablet, PLACE 1 TABLET ON THE TONGUE EVERY 8 HOURS AS NEEDED FOR NAUSEA AND VOMITING, Disp: 60 tablet, Rfl: 1    PEG-KCl-NaCl-NaSulf-Na Asc-C (Plenvu) 140 g reconstituted solution solution, Take 140 g by mouth Take As Directed. Attn: Pharmacist: please see notes for coupon code., Disp: 1 each, Rfl: 0    ProAir  (90 Base) MCG/ACT inhaler, Inhale 2 puffs Every 4 (Four) Hours As Needed for Wheezing or Shortness of Air., Disp: 6.7 g, Rfl: 0    topiramate (TOPAMAX) 50 MG tablet, Take 1 tablet by mouth 2 (Two) Times a Day., Disp: 180 tablet, Rfl: 1    amoxicillin-clavulanate (AUGMENTIN) 875-125 MG per tablet, Take 1 tablet by mouth 2 (Two) Times a Day for 10 days., Disp: 20 tablet, Rfl: 0    benzonatate (TESSALON) 200 MG capsule, Take 1 capsule by mouth 3 (Three) Times a Day As Needed for Cough., Disp: 90 capsule, Rfl: 1    promethazine-dextromethorphan (PROMETHAZINE-DM) 6.25-15 MG/5ML syrup, Take 5 mL by mouth 4 (Four) Times a Day As Needed for Cough., Disp: 473 mL, Rfl: 0    Rimegepant Sulfate (NURTEC) 75 MG tablet dispersible tablet, Take 1 tablet by mouth Daily As Needed (migraine)., Disp: 8 tablet, Rfl: 5    Current Facility-Administered Medications:     methylPREDNISolone acetate (DEPO-medrol) injection 80 mg, 80 mg, Intramuscular, Once, Sai Laboy, LEATHA    Allergies:   No Known Allergies            Objective     Physical Exam:  Vital Signs:   Vitals:    12/26/23 0810   BP: 128/73   Pulse: 87   Temp: 96.8 °F (36 °C)   SpO2: 99%   Weight: 117 kg (258 lb)   Height: 165.1 cm (65\")     Body mass index is 42.93 kg/m².           Physical Exam  HENT:      Right Ear: A middle ear effusion is present. Tympanic membrane is erythematous.      Left Ear: A middle ear effusion is present.      Nose: Rhinorrhea present. No congestion.      Mouth/Throat:      Mouth: Mucous membranes are moist.   Eyes:      Conjunctiva/sclera:      Right eye: Right conjunctiva is " injected.      Left eye: Left conjunctiva is injected.   Cardiovascular:      Rate and Rhythm: Normal rate and regular rhythm.   Pulmonary:      Effort: Pulmonary effort is normal.      Breath sounds: Wheezing present.   Musculoskeletal:      Right lower leg: No edema.      Left lower leg: No edema.   Lymphadenopathy:      Cervical: No cervical adenopathy.   Skin:     General: Skin is warm and dry.   Neurological:      Mental Status: She is alert.             Assessment / Plan      Assessment/Plan:   Diagnoses and all orders for this visit:    1. Migraine with aura and without status migrainosus, not intractable (Primary)    2. Seasonal allergies    3. Acute cough  -     methylPREDNISolone acetate (DEPO-medrol) injection 80 mg    4. Chest congestion    5. Non-recurrent acute serous otitis media of left ear    6. Mild intermittent asthma with acute exacerbation    Other orders  -     Rimegepant Sulfate (NURTEC) 75 MG tablet dispersible tablet; Take 1 tablet by mouth Daily As Needed (migraine).  Dispense: 8 tablet; Refill: 5  -     amoxicillin-clavulanate (AUGMENTIN) 875-125 MG per tablet; Take 1 tablet by mouth 2 (Two) Times a Day for 10 days.  Dispense: 20 tablet; Refill: 0  -     promethazine-dextromethorphan (PROMETHAZINE-DM) 6.25-15 MG/5ML syrup; Take 5 mL by mouth 4 (Four) Times a Day As Needed for Cough.  Dispense: 473 mL; Refill: 0  -     benzonatate (TESSALON) 200 MG capsule; Take 1 capsule by mouth 3 (Three) Times a Day As Needed for Cough.  Dispense: 90 capsule; Refill: 1       Migraines currently uncontrolled we will continue preventative therapy Topamax Emgality start Nurtec for abortive therapy recommend consistent use of Emgality keeping a migraine diary for the next 3 months if greater than 15 migraine days per month will refer to neurology to discuss Botox  Seasonal allergies will continue Flonase Claritin and Singulair  Chest congestion cough we will provide Depo-Medrol 80 Tessalon Perles for daytime  "cough Bromfed-DM for nighttime not to be used with any over-the-counter medications  Acute otitis media we will treat with Augmentin  Monitor manage asthma with acute exacerbation we will provide Depo-Medrol 80 consistent use of Symbicort rinsing mouth out after        Follow Up:   Return in about 3 months (around 3/26/2024).    Crispin Laboy, APRN    \"Please note that portions of this note were completed with a voice recognition program.\"    "

## 2024-02-19 RX ORDER — NAPROXEN 500 MG/1
500 TABLET ORAL 2 TIMES DAILY WITH MEALS
Qty: 60 TABLET | Refills: 2 | Status: SHIPPED | OUTPATIENT
Start: 2024-02-19

## 2024-03-26 ENCOUNTER — OFFICE VISIT (OUTPATIENT)
Dept: FAMILY MEDICINE CLINIC | Facility: CLINIC | Age: 29
End: 2024-03-26
Payer: MEDICAID

## 2024-03-26 VITALS
TEMPERATURE: 98.7 F | BODY MASS INDEX: 42.99 KG/M2 | WEIGHT: 258 LBS | HEIGHT: 65 IN | OXYGEN SATURATION: 98 % | HEART RATE: 80 BPM | DIASTOLIC BLOOD PRESSURE: 73 MMHG | SYSTOLIC BLOOD PRESSURE: 117 MMHG

## 2024-03-26 DIAGNOSIS — E03.9 ACQUIRED HYPOTHYROIDISM: ICD-10-CM

## 2024-03-26 DIAGNOSIS — G43.109 MIGRAINE WITH AURA AND WITHOUT STATUS MIGRAINOSUS, NOT INTRACTABLE: ICD-10-CM

## 2024-03-26 DIAGNOSIS — E66.01 CLASS 3 SEVERE OBESITY DUE TO EXCESS CALORIES WITHOUT SERIOUS COMORBIDITY WITH BODY MASS INDEX (BMI) OF 40.0 TO 44.9 IN ADULT: ICD-10-CM

## 2024-03-26 DIAGNOSIS — J45.20 MILD INTERMITTENT ASTHMA WITHOUT COMPLICATION: ICD-10-CM

## 2024-03-26 DIAGNOSIS — E78.2 MIXED HYPERLIPIDEMIA: Primary | ICD-10-CM

## 2024-03-26 DIAGNOSIS — K59.04 CHRONIC IDIOPATHIC CONSTIPATION: ICD-10-CM

## 2024-03-26 DIAGNOSIS — D50.9 IRON DEFICIENCY ANEMIA, UNSPECIFIED IRON DEFICIENCY ANEMIA TYPE: ICD-10-CM

## 2024-03-26 DIAGNOSIS — K21.9 GASTROESOPHAGEAL REFLUX DISEASE, UNSPECIFIED WHETHER ESOPHAGITIS PRESENT: ICD-10-CM

## 2024-03-26 DIAGNOSIS — R73.01 IMPAIRED FASTING GLUCOSE: ICD-10-CM

## 2024-03-26 DIAGNOSIS — J30.2 SEASONAL ALLERGIES: ICD-10-CM

## 2024-03-26 LAB
ALBUMIN SERPL-MCNC: 4.4 G/DL (ref 3.5–5.2)
ALBUMIN/GLOB SERPL: 1.9 G/DL
ALP SERPL-CCNC: 85 U/L (ref 39–117)
ALT SERPL W P-5'-P-CCNC: 10 U/L (ref 1–33)
ANION GAP SERPL CALCULATED.3IONS-SCNC: 10 MMOL/L (ref 5–15)
AST SERPL-CCNC: 10 U/L (ref 1–32)
BASOPHILS # BLD AUTO: 0.05 10*3/MM3 (ref 0–0.2)
BASOPHILS NFR BLD AUTO: 0.9 % (ref 0–1.5)
BILIRUB SERPL-MCNC: 0.2 MG/DL (ref 0–1.2)
BUN SERPL-MCNC: 14 MG/DL (ref 6–20)
BUN/CREAT SERPL: 14 (ref 7–25)
CALCIUM SPEC-SCNC: 9.5 MG/DL (ref 8.6–10.5)
CHLORIDE SERPL-SCNC: 108 MMOL/L (ref 98–107)
CHOLEST SERPL-MCNC: 187 MG/DL (ref 0–200)
CO2 SERPL-SCNC: 22 MMOL/L (ref 22–29)
CREAT SERPL-MCNC: 1 MG/DL (ref 0.57–1)
DEPRECATED RDW RBC AUTO: 39.3 FL (ref 37–54)
EGFRCR SERPLBLD CKD-EPI 2021: 78.9 ML/MIN/1.73
EOSINOPHIL # BLD AUTO: 0.17 10*3/MM3 (ref 0–0.4)
EOSINOPHIL NFR BLD AUTO: 3 % (ref 0.3–6.2)
ERYTHROCYTE [DISTWIDTH] IN BLOOD BY AUTOMATED COUNT: 12.3 % (ref 12.3–15.4)
FERRITIN SERPL-MCNC: 82.2 NG/ML (ref 13–150)
GLOBULIN UR ELPH-MCNC: 2.3 GM/DL
GLUCOSE SERPL-MCNC: 102 MG/DL (ref 65–99)
HBA1C MFR BLD: 4.9 % (ref 4.8–5.6)
HCT VFR BLD AUTO: 41.1 % (ref 34–46.6)
HDLC SERPL-MCNC: 43 MG/DL (ref 40–60)
HGB BLD-MCNC: 13.5 G/DL (ref 12–15.9)
IMM GRANULOCYTES # BLD AUTO: 0.02 10*3/MM3 (ref 0–0.05)
IMM GRANULOCYTES NFR BLD AUTO: 0.4 % (ref 0–0.5)
IRON 24H UR-MRATE: 43 MCG/DL (ref 37–145)
IRON SATN MFR SERPL: 13 % (ref 20–50)
LDLC SERPL CALC-MCNC: 131 MG/DL (ref 0–100)
LDLC/HDLC SERPL: 3.04 {RATIO}
LYMPHOCYTES # BLD AUTO: 1.5 10*3/MM3 (ref 0.7–3.1)
LYMPHOCYTES NFR BLD AUTO: 26.8 % (ref 19.6–45.3)
MCH RBC QN AUTO: 28.7 PG (ref 26.6–33)
MCHC RBC AUTO-ENTMCNC: 32.8 G/DL (ref 31.5–35.7)
MCV RBC AUTO: 87.3 FL (ref 79–97)
MONOCYTES # BLD AUTO: 0.27 10*3/MM3 (ref 0.1–0.9)
MONOCYTES NFR BLD AUTO: 4.8 % (ref 5–12)
NEUTROPHILS NFR BLD AUTO: 3.59 10*3/MM3 (ref 1.7–7)
NEUTROPHILS NFR BLD AUTO: 64.1 % (ref 42.7–76)
NRBC BLD AUTO-RTO: 0 /100 WBC (ref 0–0.2)
PLATELET # BLD AUTO: 283 10*3/MM3 (ref 140–450)
PMV BLD AUTO: 9.4 FL (ref 6–12)
POTASSIUM SERPL-SCNC: 3.8 MMOL/L (ref 3.5–5.2)
PROT SERPL-MCNC: 6.7 G/DL (ref 6–8.5)
RBC # BLD AUTO: 4.71 10*6/MM3 (ref 3.77–5.28)
SODIUM SERPL-SCNC: 140 MMOL/L (ref 136–145)
T4 FREE SERPL-MCNC: 1.41 NG/DL (ref 0.93–1.7)
TIBC SERPL-MCNC: 343 MCG/DL (ref 298–536)
TRANSFERRIN SERPL-MCNC: 230 MG/DL (ref 200–360)
TRIGL SERPL-MCNC: 67 MG/DL (ref 0–150)
TSH SERPL DL<=0.05 MIU/L-ACNC: 1.32 UIU/ML (ref 0.27–4.2)
VLDLC SERPL-MCNC: 13 MG/DL (ref 5–40)
WBC NRBC COR # BLD AUTO: 5.6 10*3/MM3 (ref 3.4–10.8)

## 2024-03-26 PROCEDURE — 80050 GENERAL HEALTH PANEL: CPT | Performed by: NURSE PRACTITIONER

## 2024-03-26 PROCEDURE — 83540 ASSAY OF IRON: CPT | Performed by: NURSE PRACTITIONER

## 2024-03-26 PROCEDURE — 84466 ASSAY OF TRANSFERRIN: CPT | Performed by: NURSE PRACTITIONER

## 2024-03-26 PROCEDURE — 82728 ASSAY OF FERRITIN: CPT | Performed by: NURSE PRACTITIONER

## 2024-03-26 PROCEDURE — 84439 ASSAY OF FREE THYROXINE: CPT | Performed by: NURSE PRACTITIONER

## 2024-03-26 PROCEDURE — 83036 HEMOGLOBIN GLYCOSYLATED A1C: CPT | Performed by: NURSE PRACTITIONER

## 2024-03-26 PROCEDURE — 80061 LIPID PANEL: CPT | Performed by: NURSE PRACTITIONER

## 2024-03-26 RX ORDER — LEVOTHYROXINE SODIUM 0.03 MG/1
25 TABLET ORAL EVERY MORNING
Qty: 90 TABLET | Refills: 1 | Status: SHIPPED | OUTPATIENT
Start: 2024-03-26

## 2024-03-26 RX ORDER — ALBUTEROL SULFATE 90 UG/1
2 AEROSOL, METERED RESPIRATORY (INHALATION) EVERY 4 HOURS PRN
Qty: 1 G | Refills: 0 | Status: SHIPPED | OUTPATIENT
Start: 2024-03-26

## 2024-03-26 RX ORDER — LORATADINE 10 MG/1
10 TABLET ORAL DAILY
Qty: 90 TABLET | Refills: 1 | Status: SHIPPED | OUTPATIENT
Start: 2024-03-26

## 2024-03-26 RX ORDER — TOPIRAMATE 50 MG/1
50 TABLET, FILM COATED ORAL 2 TIMES DAILY
Qty: 180 TABLET | Refills: 1 | Status: SHIPPED | OUTPATIENT
Start: 2024-03-26

## 2024-03-26 RX ORDER — DOCUSATE SODIUM 100 MG/1
100 CAPSULE, LIQUID FILLED ORAL 2 TIMES DAILY
Qty: 180 CAPSULE | Refills: 1 | Status: SHIPPED | OUTPATIENT
Start: 2024-03-26

## 2024-03-26 RX ORDER — DEXTROMETHORPHAN HYDROBROMIDE AND PROMETHAZINE HYDROCHLORIDE 15; 6.25 MG/5ML; MG/5ML
5 SYRUP ORAL 4 TIMES DAILY PRN
Qty: 473 ML | Refills: 0 | Status: SHIPPED | OUTPATIENT
Start: 2024-03-26

## 2024-03-26 RX ORDER — MONTELUKAST SODIUM 10 MG/1
10 TABLET ORAL EVERY EVENING
Qty: 90 TABLET | Refills: 1 | Status: SHIPPED | OUTPATIENT
Start: 2024-03-26

## 2024-03-26 RX ORDER — OMEPRAZOLE 20 MG/1
20 CAPSULE, DELAYED RELEASE ORAL DAILY
Qty: 90 CAPSULE | Refills: 1 | Status: SHIPPED | OUTPATIENT
Start: 2024-03-26

## 2024-03-26 RX ORDER — NAPROXEN 500 MG/1
500 TABLET ORAL 2 TIMES DAILY WITH MEALS
Qty: 60 TABLET | Refills: 2 | Status: SHIPPED | OUTPATIENT
Start: 2024-03-26

## 2024-03-26 NOTE — PROGRESS NOTES
Follow Up Office Visit      Patient Name: Stephania Galvan  : 1995   MRN: [id number]     Chief Complaint:    Chief Complaint   Patient presents with    Migraine    Hyperlipidemia    Hypothyroidism    Asthma    Anemia    Heartburn    impaired fasting glucose       History of Present Illness: Stephania Galvan is a 28 y.o. female who is here today to follow up for IFG, migraine, hyperlipidemia, hypothyroidism, asthma, anemia, GERD.    Labs-2023  Pap-10/2022    Migraines 4 in the past month, resolved with nurtec    Immunotherapy 1 a week family allergy and asthma  C/o sinus pressure drainage for almost a month patient does report she is not consistent with Dymista    Constipated on some days, states some day she is not drinking over 2-3 cups of water 8 ounces     Subjective      Review of Systems:   Review of Systems   Constitutional:  Negative for fever.   HENT:  Positive for ear pain, postnasal drip, rhinorrhea and sinus pressure. Negative for sore throat.    Eyes:  Negative for visual disturbance.   Respiratory:  Negative for cough.    Cardiovascular:  Negative for chest pain.   Gastrointestinal:  Negative for abdominal pain, constipation, diarrhea, nausea and vomiting.   Genitourinary:  Negative for dysuria.   Musculoskeletal:  Negative for myalgias.   Allergic/Immunologic: Positive for environmental allergies.   Neurological:  Negative for headaches.        Past Medical History:   Past Medical History:   Diagnosis Date    Allergies     Anemia     Asthma     GERD (gastroesophageal reflux disease)     Hyperlipidemia     Migraines 2021       Past Surgical History:   Past Surgical History:   Procedure Laterality Date    DENTAL PROCEDURE         Family History:   Family History   Problem Relation Age of Onset    Stomach cancer Maternal Grandmother     Stroke Other     Heart disease Other     Diabetes type II Other     Breast cancer Other        Social History:   Social History     Socioeconomic  History    Marital status: Single   Tobacco Use    Smoking status: Never     Passive exposure: Never    Smokeless tobacco: Never   Vaping Use    Vaping status: Never Used   Substance and Sexual Activity    Alcohol use: Yes     Comment: occ    Drug use: Yes     Types: Marijuana    Sexual activity: Defer       Medications:     Current Outpatient Medications:     albuterol sulfate HFA (Ventolin HFA) 108 (90 Base) MCG/ACT inhaler, Inhale 2 puffs Every 4 (Four) Hours As Needed for Wheezing., Disp: 1 g, Rfl: 0    budesonide-formoterol (SYMBICORT) 160-4.5 MCG/ACT inhaler, Inhale 2 puffs 2 (Two) Times a Day., Disp: 6 g, Rfl: 5    dicyclomine (BENTYL) 10 MG capsule, Take 1 capsule by mouth 4 (Four) Times a Day Before Meals & at Bedtime., Disp: 120 capsule, Rfl: 5    docusate sodium (Colace) 100 MG capsule, Take 1 capsule by mouth 2 (Two) Times a Day., Disp: 180 capsule, Rfl: 1    Dymista 137-50 MCG/ACT suspension, , Disp: , Rfl:     Emgality 120 MG/ML auto-injector pen, INJECT 1 ML UNDER THE SKIN INTO THE APPROPRIATE AREA AS DIRECTED 1 (ONE) TIME FOR 1 DOSE., Disp: , Rfl:     levothyroxine (SYNTHROID, LEVOTHROID) 25 MCG tablet, Take 1 tablet by mouth Every Morning., Disp: 90 tablet, Rfl: 1    linaclotide (Linzess) 145 MCG capsule capsule, Take 1 capsule by mouth Every Morning Before Breakfast., Disp: 90 capsule, Rfl: 1    loratadine (CLARITIN) 10 MG tablet, Take 1 tablet by mouth Daily., Disp: 90 tablet, Rfl: 1    montelukast (SINGULAIR) 10 MG tablet, Take 1 tablet by mouth Every Evening., Disp: 90 tablet, Rfl: 1    naproxen (NAPROSYN) 500 MG tablet, Take 1 tablet by mouth 2 (Two) Times a Day With Meals., Disp: 60 tablet, Rfl: 2    omeprazole (priLOSEC) 20 MG capsule, Take 1 capsule by mouth Daily., Disp: 90 capsule, Rfl: 1    ondansetron ODT (ZOFRAN-ODT) 4 MG disintegrating tablet, PLACE 1 TABLET ON THE TONGUE EVERY 8 HOURS AS NEEDED FOR NAUSEA AND VOMITING, Disp: 60 tablet, Rfl: 1    PEG-KCl-NaCl-NaSulf-Na Asc-C (Plenvu)  "140 g reconstituted solution solution, Take 140 g by mouth Take As Directed. Attn: Pharmacist: please see notes for coupon code., Disp: 1 each, Rfl: 0    ProAir  (90 Base) MCG/ACT inhaler, Inhale 2 puffs Every 4 (Four) Hours As Needed for Wheezing or Shortness of Air., Disp: 6.7 g, Rfl: 0    promethazine-dextromethorphan (PROMETHAZINE-DM) 6.25-15 MG/5ML syrup, Take 5 mL by mouth 4 (Four) Times a Day As Needed for Cough., Disp: 473 mL, Rfl: 0    Rimegepant Sulfate (NURTEC) 75 MG tablet dispersible tablet, Take 1 tablet by mouth Daily As Needed (migraine)., Disp: 8 tablet, Rfl: 5    topiramate (TOPAMAX) 50 MG tablet, Take 1 tablet by mouth 2 (Two) Times a Day., Disp: 180 tablet, Rfl: 1    benzonatate (TESSALON) 200 MG capsule, Take 1 capsule by mouth 3 (Three) Times a Day As Needed for Cough., Disp: 90 capsule, Rfl: 1    Allergies:   No Known Allergies            Objective     Physical Exam:  Vital Signs:   Vitals:    03/26/24 0803   BP: 117/73   Pulse: 80   Temp: 98.7 °F (37.1 °C)   SpO2: 98%   Weight: 117 kg (258 lb)   Height: 165.1 cm (65\")     Body mass index is 42.93 kg/m².           Physical Exam  HENT:      Right Ear: Tympanic membrane normal.      Left Ear: Tympanic membrane normal.      Nose: Congestion and rhinorrhea present.      Mouth/Throat:      Mouth: Mucous membranes are moist.   Eyes:      Conjunctiva/sclera: Conjunctivae normal.   Neck:      Vascular: No carotid bruit.   Cardiovascular:      Rate and Rhythm: Normal rate and regular rhythm.      Heart sounds: Normal heart sounds. No murmur heard.  Pulmonary:      Effort: Pulmonary effort is normal.      Breath sounds: Normal breath sounds.   Abdominal:      General: Bowel sounds are normal.      Palpations: Abdomen is soft.   Musculoskeletal:      Right lower leg: No edema.      Left lower leg: No edema.   Lymphadenopathy:      Cervical: No cervical adenopathy.   Skin:     General: Skin is warm and dry.   Neurological:      Mental Status: She " is alert.   Psychiatric:         Mood and Affect: Mood normal.         Behavior: Behavior normal.             Assessment / Plan      Assessment/Plan:   Diagnoses and all orders for this visit:    1. Mixed hyperlipidemia (Primary)  -     Lipid Panel    2. Impaired fasting glucose  -     Comprehensive Metabolic Panel  -     Hemoglobin A1c    3. Acquired hypothyroidism  -     TSH  -     T4, Free  -     levothyroxine (SYNTHROID, LEVOTHROID) 25 MCG tablet; Take 1 tablet by mouth Every Morning.  Dispense: 90 tablet; Refill: 1    4. Migraine with aura and without status migrainosus, not intractable  -     topiramate (TOPAMAX) 50 MG tablet; Take 1 tablet by mouth 2 (Two) Times a Day.  Dispense: 180 tablet; Refill: 1    5. Mild intermittent asthma without complication  -     albuterol sulfate HFA (Ventolin HFA) 108 (90 Base) MCG/ACT inhaler; Inhale 2 puffs Every 4 (Four) Hours As Needed for Wheezing.  Dispense: 1 g; Refill: 0  -     ProAir  (90 Base) MCG/ACT inhaler; Inhale 2 puffs Every 4 (Four) Hours As Needed for Wheezing or Shortness of Air.  Dispense: 6.7 g; Refill: 0    6. Seasonal allergies    7. Class 3 severe obesity due to excess calories without serious comorbidity with body mass index (BMI) of 40.0 to 44.9 in adult    8. Iron deficiency anemia, unspecified iron deficiency anemia type  -     CBC Auto Differential  -     Iron Profile  -     Ferritin    9. Chronic idiopathic constipation    10. Gastroesophageal reflux disease, unspecified whether esophagitis present    Other orders  -     docusate sodium (Colace) 100 MG capsule; Take 1 capsule by mouth 2 (Two) Times a Day.  Dispense: 180 capsule; Refill: 1  -     linaclotide (Linzess) 145 MCG capsule capsule; Take 1 capsule by mouth Every Morning Before Breakfast.  Dispense: 90 capsule; Refill: 1  -     loratadine (CLARITIN) 10 MG tablet; Take 1 tablet by mouth Daily.  Dispense: 90 tablet; Refill: 1  -     montelukast (SINGULAIR) 10 MG tablet; Take 1 tablet  by mouth Every Evening.  Dispense: 90 tablet; Refill: 1  -     omeprazole (priLOSEC) 20 MG capsule; Take 1 capsule by mouth Daily.  Dispense: 90 capsule; Refill: 1  -     Rimegepant Sulfate (NURTEC) 75 MG tablet dispersible tablet; Take 1 tablet by mouth Daily As Needed (migraine).  Dispense: 8 tablet; Refill: 5  -     promethazine-dextromethorphan (PROMETHAZINE-DM) 6.25-15 MG/5ML syrup; Take 5 mL by mouth 4 (Four) Times a Day As Needed for Cough.  Dispense: 473 mL; Refill: 0  -     naproxen (NAPROSYN) 500 MG tablet; Take 1 tablet by mouth 2 (Two) Times a Day With Meals.  Dispense: 60 tablet; Refill: 2    Hyperlipidemia will obtain lipid panel to monitor recommend reducing fried foods red meat pork products cheese increasing fruits vegetables whole grains overall increasing fiber in diet  Impaired fasting glucose recommend reducing added carbs sugars exercise 30 minutes daily weight loss  Hypothyroidism we will obtain labs monitor current Synthroid dose 25 mcg daily denies palpitations  Migraines currently controlled less than 14 migraine days per month on Topamax Emgality abortive therapy Nurtec  Mild intermittent asthma without complication no wheezing or shortness of breath will provide a refill of albuterol inhaler  Seasonal allergies recommend consistent use of Dymista offered Depo-Medrol 80 in office patient declines keep follow-up with allergist and continue immunotherapy provided refill Promethazine DM  Class III obesity patient's insurance does not cover Saxenda or Wegovy or Contrave recommend reducing overall caloric intake exercising 30 minutes daily  Iron deficiency will obtain labs to monitor no current supplementation  Chronic constipation recommend increasing water and fiber and exercising 30 minutes daily if persist would increase the dose of Linzess to 290 mcg daily  Reflux currently controlled Meprazole 20 mg daily          Follow Up:   Return in about 6 months (around 9/26/2024).    Crispin  "Narda, APRN    \"Please note that portions of this note were completed with a voice recognition program.\"    "

## 2024-05-22 RX ORDER — DICYCLOMINE HYDROCHLORIDE 10 MG/1
CAPSULE ORAL
Qty: 120 CAPSULE | Refills: 5 | Status: SHIPPED | OUTPATIENT
Start: 2024-05-22

## 2024-08-16 DIAGNOSIS — G43.109 MIGRAINE WITH AURA AND WITHOUT STATUS MIGRAINOSUS, NOT INTRACTABLE: ICD-10-CM

## 2024-08-16 DIAGNOSIS — E03.9 ACQUIRED HYPOTHYROIDISM: ICD-10-CM

## 2024-08-16 RX ORDER — LEVOTHYROXINE SODIUM 0.03 MG/1
25 TABLET ORAL EVERY MORNING
Qty: 90 TABLET | Refills: 1 | Status: SHIPPED | OUTPATIENT
Start: 2024-08-16

## 2024-08-16 RX ORDER — TOPIRAMATE 50 MG/1
50 TABLET, FILM COATED ORAL 2 TIMES DAILY
Qty: 180 TABLET | Refills: 1 | Status: SHIPPED | OUTPATIENT
Start: 2024-08-16

## 2024-08-16 RX ORDER — OMEPRAZOLE 20 MG/1
20 CAPSULE, DELAYED RELEASE ORAL DAILY
Qty: 90 CAPSULE | Refills: 1 | Status: SHIPPED | OUTPATIENT
Start: 2024-08-16

## 2024-10-01 NOTE — PROGRESS NOTES
Patient Name: Stephania Galvan  : 1995   MRN: [id number]     Chief Complaint:  annual physical exam       History of Present Illness: Stephania Galvan is a 28 y.o. female who is here today for annual physical exam  Also to follow up for  IFG, migraine, hyperlipidemia, hypothyroidism, asthma, anemia, GERD.     Labs-3/2024  Pap-10/2022    Migraines 4 in the past month, resolved with nurtec     Immunotherapy 1 a week family allergy and asthma    Pt c/o increase in bruising, concerned its her iron that is low, no current supplement                     Review of Systems:   Review of Systems   Constitutional:  Negative for fever.   HENT:  Negative for ear pain and sore throat.    Respiratory:  Negative for cough.    Cardiovascular:  Negative for chest pain.   Gastrointestinal:  Negative for abdominal pain, constipation, diarrhea, nausea and vomiting.   Genitourinary:  Negative for dysuria.   Musculoskeletal:  Negative for myalgias.   Neurological:  Positive for headaches.        Past Medical History:   Past Medical History:   Diagnosis Date    Allergies     Anemia     Asthma     GERD (gastroesophageal reflux disease)     Hyperlipidemia     Migraines 2021       Past Surgical History:   Past Surgical History:   Procedure Laterality Date    DENTAL PROCEDURE         Family History:   Family History   Problem Relation Age of Onset    Stomach cancer Maternal Grandmother     Stroke Other     Heart disease Other     Diabetes type II Other     Breast cancer Other        Social History:   Social History     Socioeconomic History    Marital status: Single   Tobacco Use    Smoking status: Never     Passive exposure: Never    Smokeless tobacco: Never   Vaping Use    Vaping status: Never Used   Substance and Sexual Activity    Alcohol use: Yes     Comment: occ    Drug use: Yes     Types: Marijuana     Sexual activity: Defer       Medications:     Current Outpatient Medications:     albuterol sulfate HFA (Ventolin HFA) 108 (90 Base) MCG/ACT inhaler, Inhale 2 puffs Every 4 (Four) Hours As Needed for Wheezing., Disp: 1 g, Rfl: 0    benzonatate (TESSALON) 200 MG capsule, Take 1 capsule by mouth 3 (Three) Times a Day As Needed for Cough., Disp: 90 capsule, Rfl: 1    budesonide-formoterol (SYMBICORT) 160-4.5 MCG/ACT inhaler, Inhale 2 puffs 2 (Two) Times a Day., Disp: 6 g, Rfl: 5    cetirizine (zyrTEC) 10 MG tablet, Take 1 tablet by mouth Daily., Disp: , Rfl:     dicyclomine (BENTYL) 10 MG capsule, Take 1 capsule by mouth 4 (Four) Times a Day Before Meals & at Bedtime., Disp: 120 capsule, Rfl: 5    docusate sodium (Colace) 100 MG capsule, Take 1 capsule by mouth 2 (Two) Times a Day., Disp: 180 capsule, Rfl: 1    Dymista 137-50 MCG/ACT suspension, , Disp: , Rfl:     Emgality 120 MG/ML auto-injector pen, Inject 1 mL under the skin into the appropriate area as directed Every 30 (Thirty) Days., Disp: 1.12 mL, Rfl: 5    EPINEPHrine (EPIPEN) 0.3 MG/0.3ML solution auto-injector injection, USE AS DIRECTED FOR ACUTE ALLERGIC REACTION, Disp: , Rfl:     levothyroxine (SYNTHROID, LEVOTHROID) 25 MCG tablet, Take 1 tablet by mouth Every Morning., Disp: 90 tablet, Rfl: 1    linaclotide (Linzess) 145 MCG capsule capsule, Take 1 capsule by mouth Every Morning Before Breakfast., Disp: 90 capsule, Rfl: 1    loratadine (CLARITIN) 10 MG tablet, Take 1 tablet by mouth Daily., Disp: 90 tablet, Rfl: 1    montelukast (SINGULAIR) 10 MG tablet, Take 1 tablet by mouth Every Evening., Disp: 90 tablet, Rfl: 1    naproxen (NAPROSYN) 500 MG tablet, Take 1 tablet by mouth 2 (Two) Times a Day With Meals., Disp: 60 tablet, Rfl: 2    omeprazole (priLOSEC) 20 MG capsule, Take 1 capsule by mouth Daily., Disp: 90 capsule, Rfl: 1    ondansetron ODT (ZOFRAN-ODT) 4 MG disintegrating tablet, PLACE 1 TABLET ON THE TONGUE EVERY 8 HOURS AS NEEDED FOR NAUSEA AND  "VOMITING, Disp: 60 tablet, Rfl: 1    PEG-KCl-NaCl-NaSulf-Na Asc-C (Plenvu) 140 g reconstituted solution solution, Take 140 g by mouth Take As Directed. Attn: Pharmacist: please see notes for coupon code., Disp: 1 each, Rfl: 0    ProAir  (90 Base) MCG/ACT inhaler, Inhale 2 puffs Every 4 (Four) Hours As Needed for Wheezing or Shortness of Air., Disp: 6.7 g, Rfl: 0    Rimegepant Sulfate (NURTEC) 75 MG tablet dispersible tablet, Take 1 tablet by mouth Daily As Needed (migraine)., Disp: 8 tablet, Rfl: 5    Spiriva HandiHaler 18 MCG per inhalation capsule, Place 1 capsule into inhaler and inhale Daily., Disp: 30 capsule, Rfl: 5    topiramate (TOPAMAX) 50 MG tablet, Take 1 tablet by mouth 2 (Two) Times a Day., Disp: 180 tablet, Rfl: 1    Allergies:   No Known Allergies            Objective     Physical Exam:  Vital Signs:   Vitals:    10/02/24 1041   BP: 124/71   Pulse: 66   Temp: 98.2 °F (36.8 °C)   TempSrc: Temporal   SpO2: 97%   Weight: 118 kg (261 lb)   Height: 165.1 cm (65\")     Body mass index is 43.43 kg/m².           Physical Exam  HENT:      Right Ear: Tympanic membrane normal.      Left Ear: Tympanic membrane normal.      Nose: Nose normal.      Mouth/Throat:      Mouth: Mucous membranes are moist.   Eyes:      Conjunctiva/sclera: Conjunctivae normal.   Neck:      Thyroid: No thyroid tenderness.      Vascular: No carotid bruit.   Cardiovascular:      Rate and Rhythm: Normal rate and regular rhythm.      Heart sounds: Normal heart sounds. No murmur heard.  Pulmonary:      Effort: Pulmonary effort is normal.      Breath sounds: Normal breath sounds.   Abdominal:      General: Bowel sounds are normal.      Palpations: Abdomen is soft.   Musculoskeletal:      Right lower leg: No edema.      Left lower leg: No edema.   Lymphadenopathy:      Cervical: No cervical adenopathy.   Skin:     General: Skin is warm and dry.   Neurological:      Mental Status: She is alert.   Psychiatric:         Mood and Affect: Mood " normal.         Behavior: Behavior normal.             Assessment / Plan      Assessment/Plan:   Diagnoses and all orders for this visit:    1. Annual physical exam    2. Mixed hyperlipidemia    3. Impaired fasting glucose    4. Acquired hypothyroidism  -     levothyroxine (SYNTHROID, LEVOTHROID) 25 MCG tablet; Take 1 tablet by mouth Every Morning.  Dispense: 90 tablet; Refill: 1    5. Gastroesophageal reflux disease, unspecified whether esophagitis present    6. Chronic idiopathic constipation    7. Iron deficiency anemia, unspecified iron deficiency anemia type    8. Migraine with aura and without status migrainosus, not intractable  -     topiramate (TOPAMAX) 50 MG tablet; Take 1 tablet by mouth 2 (Two) Times a Day.  Dispense: 180 tablet; Refill: 1    9. Mild intermittent asthma without complication  -     budesonide-formoterol (SYMBICORT) 160-4.5 MCG/ACT inhaler; Inhale 2 puffs 2 (Two) Times a Day.  Dispense: 6 g; Refill: 5  -     ProAir  (90 Base) MCG/ACT inhaler; Inhale 2 puffs Every 4 (Four) Hours As Needed for Wheezing or Shortness of Air.  Dispense: 6.7 g; Refill: 0    10. Seasonal allergies    11. Class 3 severe obesity due to excess calories without serious comorbidity with body mass index (BMI) of 40.0 to 44.9 in adult    12. Need for influenza vaccination  -     Fluzone >6mos ([id number])    Other orders  -     docusate sodium (Colace) 100 MG capsule; Take 1 capsule by mouth 2 (Two) Times a Day.  Dispense: 180 capsule; Refill: 1  -     dicyclomine (BENTYL) 10 MG capsule; Take 1 capsule by mouth 4 (Four) Times a Day Before Meals & at Bedtime.  Dispense: 120 capsule; Refill: 5  -     Emgality 120 MG/ML auto-injector pen; Inject 1 mL under the skin into the appropriate area as directed Every 30 (Thirty) Days.  Dispense: 1.12 mL; Refill: 5  -     montelukast (SINGULAIR) 10 MG tablet; Take 1 tablet by mouth Every Evening.  Dispense: 90 tablet; Refill: 1  -     omeprazole (priLOSEC) 20 MG capsule; Take  "1 capsule by mouth Daily.  Dispense: 90 capsule; Refill: 1  -     loratadine (CLARITIN) 10 MG tablet; Take 1 tablet by mouth Daily.  Dispense: 90 tablet; Refill: 1  -     Rimegepant Sulfate (NURTEC) 75 MG tablet dispersible tablet; Take 1 tablet by mouth Daily As Needed (migraine).  Dispense: 8 tablet; Refill: 5  -     Spiriva HandiHaler 18 MCG per inhalation capsule; Place 1 capsule into inhaler and inhale Daily.  Dispense: 30 capsule; Refill: 5         Annual physical exam immunizations reviewed with the patient influenza vaccine will be provided today.  Commend screening mammogram started age 40 unless signs or symptoms occur  Screening colonoscopy age 45 unless sinus symptoms occur Pap smear is up-to-date  Hyperlipidemia will obtain lipid panel to monitor do recommend cutting out fried foods red meat pork products cheese increasing fruits vegetables whole grains  Impaired fasting glucose will obtain hemoglobin A1c to monitor recommend to [employer] exercise thermos daily weight loss  Hypothyroidism obtain labs monitor current Synthroid dose 25 mcg daily denies palpitations  Reflux currently controlled with omeprazole provide refills    Constipation currently controlled with Colace  Iron deficiency anemia obtain labs to monitor  Migraines currently controlled with preventative therapy Emgality Topamax and abortive therapy Nurtec less than 14 migraine days per month  Mild intermittent asthma without complication currently uses Symbicort and Spiriva recommend rinse mouth out after each use of Symbicort no wheezing or shortness of breath at this time  Seasonal allergies currently controlled with Dymista Zyrtec and currently receiving immunotherapy with family allergy and asthma      Follow Up:   Return in about 6 months (around 4/2/2025).    Crispin Laboy, LEATHA    \"Please note that portions of this note were completed with a voice recognition program.\"    "

## 2024-10-02 ENCOUNTER — OFFICE VISIT (OUTPATIENT)
Dept: FAMILY MEDICINE CLINIC | Facility: CLINIC | Age: 29
End: 2024-10-02
Payer: MEDICAID

## 2024-10-02 VITALS
WEIGHT: 261 LBS | OXYGEN SATURATION: 97 % | BODY MASS INDEX: 43.49 KG/M2 | SYSTOLIC BLOOD PRESSURE: 124 MMHG | HEIGHT: 65 IN | TEMPERATURE: 98.2 F | HEART RATE: 66 BPM | DIASTOLIC BLOOD PRESSURE: 71 MMHG

## 2024-10-02 DIAGNOSIS — E66.01 CLASS 3 SEVERE OBESITY DUE TO EXCESS CALORIES WITHOUT SERIOUS COMORBIDITY WITH BODY MASS INDEX (BMI) OF 40.0 TO 44.9 IN ADULT: ICD-10-CM

## 2024-10-02 DIAGNOSIS — G43.109 MIGRAINE WITH AURA AND WITHOUT STATUS MIGRAINOSUS, NOT INTRACTABLE: ICD-10-CM

## 2024-10-02 DIAGNOSIS — E03.9 ACQUIRED HYPOTHYROIDISM: ICD-10-CM

## 2024-10-02 DIAGNOSIS — Z23 NEED FOR INFLUENZA VACCINATION: ICD-10-CM

## 2024-10-02 DIAGNOSIS — R73.01 IMPAIRED FASTING GLUCOSE: ICD-10-CM

## 2024-10-02 DIAGNOSIS — Z00.00 ANNUAL PHYSICAL EXAM: ICD-10-CM

## 2024-10-02 DIAGNOSIS — J45.20 MILD INTERMITTENT ASTHMA WITHOUT COMPLICATION: ICD-10-CM

## 2024-10-02 DIAGNOSIS — K21.9 GASTROESOPHAGEAL REFLUX DISEASE, UNSPECIFIED WHETHER ESOPHAGITIS PRESENT: ICD-10-CM

## 2024-10-02 DIAGNOSIS — K59.04 CHRONIC IDIOPATHIC CONSTIPATION: ICD-10-CM

## 2024-10-02 DIAGNOSIS — D50.9 IRON DEFICIENCY ANEMIA, UNSPECIFIED IRON DEFICIENCY ANEMIA TYPE: ICD-10-CM

## 2024-10-02 DIAGNOSIS — E66.813 CLASS 3 SEVERE OBESITY DUE TO EXCESS CALORIES WITHOUT SERIOUS COMORBIDITY WITH BODY MASS INDEX (BMI) OF 40.0 TO 44.9 IN ADULT: ICD-10-CM

## 2024-10-02 DIAGNOSIS — J30.2 SEASONAL ALLERGIES: ICD-10-CM

## 2024-10-02 DIAGNOSIS — E78.2 MIXED HYPERLIPIDEMIA: ICD-10-CM

## 2024-10-02 PROCEDURE — 84439 ASSAY OF FREE THYROXINE: CPT | Performed by: NURSE PRACTITIONER

## 2024-10-02 PROCEDURE — 80050 GENERAL HEALTH PANEL: CPT | Performed by: NURSE PRACTITIONER

## 2024-10-02 PROCEDURE — 80061 LIPID PANEL: CPT | Performed by: NURSE PRACTITIONER

## 2024-10-02 PROCEDURE — 84466 ASSAY OF TRANSFERRIN: CPT | Performed by: NURSE PRACTITIONER

## 2024-10-02 PROCEDURE — 90471 IMMUNIZATION ADMIN: CPT | Performed by: NURSE PRACTITIONER

## 2024-10-02 PROCEDURE — 82570 ASSAY OF URINE CREATININE: CPT | Performed by: NURSE PRACTITIONER

## 2024-10-02 PROCEDURE — 83540 ASSAY OF IRON: CPT | Performed by: NURSE PRACTITIONER

## 2024-10-02 PROCEDURE — 82043 UR ALBUMIN QUANTITATIVE: CPT | Performed by: NURSE PRACTITIONER

## 2024-10-02 PROCEDURE — 90656 IIV3 VACC NO PRSV 0.5 ML IM: CPT | Performed by: NURSE PRACTITIONER

## 2024-10-02 PROCEDURE — 2014F MENTAL STATUS ASSESS: CPT | Performed by: NURSE PRACTITIONER

## 2024-10-02 PROCEDURE — 83036 HEMOGLOBIN GLYCOSYLATED A1C: CPT | Performed by: NURSE PRACTITIONER

## 2024-10-02 PROCEDURE — 81003 URINALYSIS AUTO W/O SCOPE: CPT | Performed by: NURSE PRACTITIONER

## 2024-10-02 PROCEDURE — 82728 ASSAY OF FERRITIN: CPT | Performed by: NURSE PRACTITIONER

## 2024-10-02 PROCEDURE — 99395 PREV VISIT EST AGE 18-39: CPT | Performed by: NURSE PRACTITIONER

## 2024-10-02 RX ORDER — LORATADINE 10 MG/1
10 TABLET ORAL DAILY
Qty: 90 TABLET | Refills: 1 | Status: SHIPPED | OUTPATIENT
Start: 2024-10-02

## 2024-10-02 RX ORDER — TIOTROPIUM BROMIDE 18 UG/1
1 CAPSULE ORAL; RESPIRATORY (INHALATION) DAILY
COMMUNITY
Start: 2024-09-12 | End: 2024-10-02 | Stop reason: SDUPTHER

## 2024-10-02 RX ORDER — TOPIRAMATE 50 MG/1
50 TABLET, FILM COATED ORAL 2 TIMES DAILY
Qty: 180 TABLET | Refills: 1 | Status: SHIPPED | OUTPATIENT
Start: 2024-10-02

## 2024-10-02 RX ORDER — MONTELUKAST SODIUM 10 MG/1
10 TABLET ORAL EVERY EVENING
Qty: 90 TABLET | Refills: 1 | Status: SHIPPED | OUTPATIENT
Start: 2024-10-02

## 2024-10-02 RX ORDER — ALBUTEROL SULFATE 90 UG/1
2 AEROSOL, METERED RESPIRATORY (INHALATION) EVERY 4 HOURS PRN
Qty: 6.7 G | Refills: 0 | Status: SHIPPED | OUTPATIENT
Start: 2024-10-02

## 2024-10-02 RX ORDER — LEVOTHYROXINE SODIUM 25 UG/1
25 TABLET ORAL EVERY MORNING
Qty: 90 TABLET | Refills: 1 | Status: SHIPPED | OUTPATIENT
Start: 2024-10-02

## 2024-10-02 RX ORDER — CETIRIZINE HYDROCHLORIDE 10 MG/1
1 TABLET ORAL DAILY
COMMUNITY
Start: 2024-09-22

## 2024-10-02 RX ORDER — EPINEPHRINE 0.3 MG/.3ML
INJECTION SUBCUTANEOUS
COMMUNITY
Start: 2024-08-26

## 2024-10-02 RX ORDER — GALCANEZUMAB 120 MG/ML
120 INJECTION, SOLUTION SUBCUTANEOUS
Qty: 1.12 ML | Refills: 5 | Status: SHIPPED | OUTPATIENT
Start: 2024-10-02

## 2024-10-02 RX ORDER — BUDESONIDE AND FORMOTEROL FUMARATE DIHYDRATE 160; 4.5 UG/1; UG/1
2 AEROSOL RESPIRATORY (INHALATION)
Qty: 6 G | Refills: 5 | Status: SHIPPED | OUTPATIENT
Start: 2024-10-02

## 2024-10-02 RX ORDER — DOCUSATE SODIUM 100 MG/1
100 CAPSULE, LIQUID FILLED ORAL 2 TIMES DAILY
Qty: 180 CAPSULE | Refills: 1 | Status: SHIPPED | OUTPATIENT
Start: 2024-10-02

## 2024-10-02 RX ORDER — TIOTROPIUM BROMIDE 18 UG/1
1 CAPSULE ORAL; RESPIRATORY (INHALATION) DAILY
Qty: 30 CAPSULE | Refills: 5 | Status: SHIPPED | OUTPATIENT
Start: 2024-10-02

## 2024-10-02 RX ORDER — DICYCLOMINE HYDROCHLORIDE 10 MG/1
10 CAPSULE ORAL
Qty: 120 CAPSULE | Refills: 5 | Status: SHIPPED | OUTPATIENT
Start: 2024-10-02

## 2025-01-20 RX ORDER — LINACLOTIDE 145 UG/1
145 CAPSULE, GELATIN COATED ORAL
Qty: 90 CAPSULE | Refills: 1 | Status: SHIPPED | OUTPATIENT
Start: 2025-01-20